# Patient Record
Sex: FEMALE | Race: BLACK OR AFRICAN AMERICAN | Employment: OTHER | URBAN - METROPOLITAN AREA
[De-identification: names, ages, dates, MRNs, and addresses within clinical notes are randomized per-mention and may not be internally consistent; named-entity substitution may affect disease eponyms.]

---

## 2018-04-17 PROBLEM — M25.562 KNEE PAIN, LEFT: Status: ACTIVE | Noted: 2018-04-17

## 2018-04-17 PROBLEM — K21.00 REFLUX ESOPHAGITIS: Status: ACTIVE | Noted: 2018-04-17

## 2018-04-17 PROBLEM — B35.3 TINEA PEDIS: Status: ACTIVE | Noted: 2018-04-17

## 2018-04-17 PROBLEM — J30.9 RHINITIS, ALLERGIC: Status: ACTIVE | Noted: 2018-04-17

## 2018-04-17 PROBLEM — R74.01 NONSPECIFIC ELEVATION OF LEVELS OF TRANSAMINASE AND LACTIC ACID DEHYDROGENASE (LDH): Status: ACTIVE | Noted: 2018-04-17

## 2018-04-17 PROBLEM — D64.9 NORMOCYTIC ANEMIA: Status: ACTIVE | Noted: 2018-04-17

## 2018-04-17 PROBLEM — M25.522 PAIN IN JOINT OF LEFT ELBOW: Status: ACTIVE | Noted: 2018-04-17

## 2018-04-17 PROBLEM — Z78.0 POSTMENOPAUSAL STATUS: Status: ACTIVE | Noted: 2018-04-17

## 2018-04-17 PROBLEM — N90.89 LESION OF VULVA: Status: ACTIVE | Noted: 2018-04-17

## 2018-04-17 PROBLEM — R42 DIZZINESS: Status: ACTIVE | Noted: 2018-04-17

## 2018-04-17 PROBLEM — F32.A DEPRESSION: Status: ACTIVE | Noted: 2018-04-17

## 2018-04-17 PROBLEM — I10 HYPERTENSION: Status: ACTIVE | Noted: 2018-04-17

## 2018-04-17 PROBLEM — N95.2 POSTMENOPAUSAL ATROPHIC VAGINITIS: Status: ACTIVE | Noted: 2018-04-17

## 2018-04-17 PROBLEM — J32.9 SINUSITIS, CHRONIC: Status: ACTIVE | Noted: 2018-04-17

## 2018-04-17 PROBLEM — M25.521 ELBOW PAIN, RIGHT: Status: ACTIVE | Noted: 2018-04-17

## 2018-04-17 PROBLEM — M17.0 OSTEOARTHRITIS OF KNEES, BILATERAL: Status: ACTIVE | Noted: 2018-04-17

## 2018-04-17 PROBLEM — H53.419 SCOTOMA: Status: ACTIVE | Noted: 2018-04-17

## 2018-04-17 PROBLEM — R10.2 PELVIC PAIN: Status: ACTIVE | Noted: 2018-04-17

## 2018-04-17 PROBLEM — R11.2 NAUSEA AND VOMITING: Status: ACTIVE | Noted: 2018-04-17

## 2018-04-17 PROBLEM — M25.561 KNEE PAIN, BILATERAL: Status: ACTIVE | Noted: 2018-04-17

## 2018-04-17 PROBLEM — Z80.0 FAMILY HISTORY OF MALIGNANT NEOPLASM OF GASTROINTESTINAL TRACT: Status: ACTIVE | Noted: 2018-04-17

## 2018-04-17 PROBLEM — M25.562 KNEE PAIN, BILATERAL: Status: ACTIVE | Noted: 2018-04-17

## 2018-04-17 PROBLEM — R20.2 PARESTHESIA OF HAND, BILATERAL: Status: ACTIVE | Noted: 2018-04-17

## 2018-04-17 PROBLEM — R31.29 OTHER MICROSCOPIC HEMATURIA: Status: ACTIVE | Noted: 2018-04-17

## 2018-04-17 PROBLEM — R07.89 CHEST PAIN, ATYPICAL: Status: ACTIVE | Noted: 2018-04-17

## 2018-04-17 PROBLEM — M25.521 PAIN IN JOINT OF RIGHT ELBOW: Status: ACTIVE | Noted: 2018-04-17

## 2018-04-17 PROBLEM — M25.50 PAIN IN JOINT INVOLVING MULTIPLE SITES: Status: ACTIVE | Noted: 2018-04-17

## 2018-04-17 PROBLEM — R19.7 DIARRHEA: Status: ACTIVE | Noted: 2018-04-17

## 2018-04-17 PROBLEM — J01.00 SINUSITIS, ACUTE MAXILLARY: Status: ACTIVE | Noted: 2018-04-17

## 2018-04-17 PROBLEM — M54.50 LUMBAR BACK PAIN: Status: ACTIVE | Noted: 2018-04-17

## 2018-04-17 PROBLEM — K29.70 GASTRITIS: Status: ACTIVE | Noted: 2018-04-17

## 2018-04-17 PROBLEM — K21.9 GERD (GASTROESOPHAGEAL REFLUX DISEASE): Status: ACTIVE | Noted: 2018-04-17

## 2018-04-17 PROBLEM — R74.02 NONSPECIFIC ELEVATION OF LEVELS OF TRANSAMINASE AND LACTIC ACID DEHYDROGENASE (LDH): Status: ACTIVE | Noted: 2018-04-17

## 2018-04-17 PROBLEM — M25.561 KNEE PAIN, RIGHT: Status: ACTIVE | Noted: 2018-04-17

## 2018-04-17 PROBLEM — D21.9 FIBROIDS: Status: ACTIVE | Noted: 2018-04-17

## 2018-04-17 PROBLEM — Z77.21 PERSONAL HISTORY OF EXPOSURE TO POTENTIALLY HAZARDOUS BODY FLUIDS: Status: ACTIVE | Noted: 2018-04-17

## 2018-06-07 PROBLEM — J01.00 SINUSITIS, ACUTE MAXILLARY: Status: RESOLVED | Noted: 2018-04-17 | Resolved: 2018-06-07

## 2018-06-07 PROBLEM — N95.0 POSTMENOPAUSAL BLEEDING: Status: ACTIVE | Noted: 2018-06-07

## 2018-06-07 PROBLEM — Z78.0 POSTMENOPAUSAL STATUS: Status: RESOLVED | Noted: 2018-04-17 | Resolved: 2018-06-07

## 2018-06-07 PROBLEM — N90.89 LESION OF VULVA: Status: RESOLVED | Noted: 2018-04-17 | Resolved: 2018-06-07

## 2018-06-07 PROBLEM — Z12.4 SCREENING FOR MALIGNANT NEOPLASM OF CERVIX: Status: ACTIVE | Noted: 2018-06-07

## 2018-06-07 PROBLEM — N36.2 URETHRAL CARUNCLE: Status: ACTIVE | Noted: 2018-06-07

## 2018-06-07 PROBLEM — J30.9 RHINITIS, ALLERGIC: Status: RESOLVED | Noted: 2018-04-17 | Resolved: 2018-06-07

## 2018-06-07 PROBLEM — Z77.21 PERSONAL HISTORY OF EXPOSURE TO POTENTIALLY HAZARDOUS BODY FLUIDS: Status: RESOLVED | Noted: 2018-04-17 | Resolved: 2018-06-07

## 2018-06-07 PROBLEM — B35.3 TINEA PEDIS: Status: RESOLVED | Noted: 2018-04-17 | Resolved: 2018-06-07

## 2018-06-07 PROBLEM — Z01.419 WOMEN'S ANNUAL ROUTINE GYNECOLOGICAL EXAMINATION: Status: ACTIVE | Noted: 2018-06-07

## 2018-07-18 PROBLEM — J30.9 ALLERGIC SINUSITIS: Status: ACTIVE | Noted: 2018-07-18

## 2019-10-18 ENCOUNTER — HOSPITAL ENCOUNTER (OUTPATIENT)
Dept: PREADMISSION TESTING | Age: 59
Discharge: HOME | End: 2019-10-18
Payer: MEDICARE

## 2019-10-18 VITALS
HEART RATE: 65 BPM | SYSTOLIC BLOOD PRESSURE: 136 MMHG | RESPIRATION RATE: 18 BRPM | BODY MASS INDEX: 22.56 KG/M2 | TEMPERATURE: 98.6 F | WEIGHT: 119.5 LBS | HEIGHT: 61 IN | DIASTOLIC BLOOD PRESSURE: 83 MMHG

## 2019-10-18 LAB
ALBUMIN SERPL-MCNC: 3.9 G/DL (ref 3.5–5)
ALBUMIN/GLOB SERPL: 1.2 {RATIO} (ref 1.1–2.2)
ALP SERPL-CCNC: 90 U/L (ref 45–117)
ALT SERPL-CCNC: 30 U/L (ref 12–78)
ANION GAP SERPL CALC-SCNC: 6 MMOL/L (ref 5–15)
AST SERPL-CCNC: 20 U/L (ref 15–37)
ATRIAL RATE: 56 BPM
BASOPHILS # BLD: 0 K/UL (ref 0–0.1)
BASOPHILS NFR BLD: 1 % (ref 0–1)
BILIRUB SERPL-MCNC: 0.4 MG/DL (ref 0.2–1)
BUN SERPL-MCNC: 15 MG/DL (ref 6–20)
BUN/CREAT SERPL: 17 (ref 12–20)
CALCIUM SERPL-MCNC: 9 MG/DL (ref 8.5–10.1)
CALCULATED P AXIS, ECG09: 80 DEGREES
CALCULATED R AXIS, ECG10: 66 DEGREES
CALCULATED T AXIS, ECG11: 74 DEGREES
CHLORIDE SERPL-SCNC: 107 MMOL/L (ref 97–108)
CO2 SERPL-SCNC: 28 MMOL/L (ref 21–32)
CREAT SERPL-MCNC: 0.88 MG/DL (ref 0.55–1.02)
DIAGNOSIS, 93000: NORMAL
DIFFERENTIAL METHOD BLD: ABNORMAL
EOSINOPHIL # BLD: 0.2 K/UL (ref 0–0.4)
EOSINOPHIL NFR BLD: 3 % (ref 0–7)
ERYTHROCYTE [DISTWIDTH] IN BLOOD BY AUTOMATED COUNT: 14.6 % (ref 11.5–14.5)
GLOBULIN SER CALC-MCNC: 3.2 G/DL (ref 2–4)
GLUCOSE SERPL-MCNC: 96 MG/DL (ref 65–100)
HCT VFR BLD AUTO: 34 % (ref 35–47)
HGB BLD-MCNC: 10.5 G/DL (ref 11.5–16)
IMM GRANULOCYTES # BLD AUTO: 0 K/UL (ref 0–0.04)
IMM GRANULOCYTES NFR BLD AUTO: 0 % (ref 0–0.5)
LYMPHOCYTES # BLD: 1.8 K/UL (ref 0.8–3.5)
LYMPHOCYTES NFR BLD: 29 % (ref 12–49)
MCH RBC QN AUTO: 27.6 PG (ref 26–34)
MCHC RBC AUTO-ENTMCNC: 30.9 G/DL (ref 30–36.5)
MCV RBC AUTO: 89.5 FL (ref 80–99)
MONOCYTES # BLD: 0.3 K/UL (ref 0–1)
MONOCYTES NFR BLD: 5 % (ref 5–13)
NEUTS SEG # BLD: 3.9 K/UL (ref 1.8–8)
NEUTS SEG NFR BLD: 62 % (ref 32–75)
NRBC # BLD: 0 K/UL (ref 0–0.01)
NRBC BLD-RTO: 0 PER 100 WBC
P-R INTERVAL, ECG05: 160 MS
PLATELET # BLD AUTO: 244 K/UL (ref 150–400)
PMV BLD AUTO: 11.3 FL (ref 8.9–12.9)
POTASSIUM SERPL-SCNC: 4.1 MMOL/L (ref 3.5–5.1)
PROT SERPL-MCNC: 7.1 G/DL (ref 6.4–8.2)
Q-T INTERVAL, ECG07: 456 MS
QRS DURATION, ECG06: 66 MS
QTC CALCULATION (BEZET), ECG08: 440 MS
RBC # BLD AUTO: 3.8 M/UL (ref 3.8–5.2)
SODIUM SERPL-SCNC: 141 MMOL/L (ref 136–145)
VENTRICULAR RATE, ECG03: 56 BPM
WBC # BLD AUTO: 6.2 K/UL (ref 3.6–11)

## 2019-10-18 PROCEDURE — 80053 COMPREHEN METABOLIC PANEL: CPT

## 2019-10-18 PROCEDURE — 85025 COMPLETE CBC W/AUTO DIFF WBC: CPT

## 2019-10-18 PROCEDURE — 36415 COLL VENOUS BLD VENIPUNCTURE: CPT

## 2019-10-18 PROCEDURE — 93005 ELECTROCARDIOGRAM TRACING: CPT

## 2019-10-18 RX ORDER — FLUTICASONE PROPIONATE 50 MCG
2 SPRAY, SUSPENSION (ML) NASAL
COMMUNITY
End: 2019-12-08 | Stop reason: SDUPTHER

## 2019-10-18 NOTE — PERIOP NOTES
Preoperative instructions reviewed with patient. Patient given  2 bottles of CHG soap. Instructions reviewed on use of CHG soap. Patient given SSI infection FAQS sheet,    Patient was given the opportunity to ask questions on the information provided.

## 2019-10-21 NOTE — PERIOP NOTES
PAT TEST RESULTS FAXED TO DR. Adeola Stewart OFFICE; CALLED OFFICE, SPOKE WITH  WHO WILL LET DR. ORANTES KNOW TO REVIEW ABNORMAL LAB RESULTS

## 2019-10-24 ENCOUNTER — ANESTHESIA EVENT (OUTPATIENT)
Dept: MEDSURG UNIT | Age: 59
End: 2019-10-24
Payer: MEDICARE

## 2019-10-25 ENCOUNTER — ANESTHESIA (OUTPATIENT)
Dept: MEDSURG UNIT | Age: 59
End: 2019-10-25
Payer: MEDICARE

## 2019-10-25 ENCOUNTER — HOSPITAL ENCOUNTER (OUTPATIENT)
Age: 59
Setting detail: OBSERVATION
Discharge: HOME | End: 2019-10-26
Attending: OTOLARYNGOLOGY | Admitting: OTOLARYNGOLOGY
Payer: MEDICARE

## 2019-10-25 DIAGNOSIS — E04.2 MULTINODULAR GOITER: Primary | ICD-10-CM

## 2019-10-25 PROBLEM — Z98.890 STATUS POST SURGERY: Status: ACTIVE | Noted: 2019-10-25

## 2019-10-25 LAB
CALCIUM SERPL-MCNC: 9.4 MG/DL (ref 8.5–10.1)
MAGNESIUM SERPL-MCNC: 2 MG/DL (ref 1.6–2.4)
PHOSPHATE SERPL-MCNC: 3.7 MG/DL (ref 2.6–4.7)

## 2019-10-25 PROCEDURE — 74011250636 HC RX REV CODE- 250/636: Performed by: NURSE ANESTHETIST, CERTIFIED REGISTERED

## 2019-10-25 PROCEDURE — 76030000003 HC AMB SURG OR TIME 1.5 TO 2: Performed by: OTOLARYNGOLOGY

## 2019-10-25 PROCEDURE — 36415 COLL VENOUS BLD VENIPUNCTURE: CPT

## 2019-10-25 PROCEDURE — 77030014008 HC SPNG HEMSTAT J&J -C: Performed by: OTOLARYNGOLOGY

## 2019-10-25 PROCEDURE — 77030040356 HC CORD BPLR FRCP COVD -A: Performed by: OTOLARYNGOLOGY

## 2019-10-25 PROCEDURE — 77030002996 HC SUT SLK J&J -A: Performed by: OTOLARYNGOLOGY

## 2019-10-25 PROCEDURE — 77030040361 HC SLV COMPR DVT MDII -B: Performed by: OTOLARYNGOLOGY

## 2019-10-25 PROCEDURE — 77030011640 HC PAD GRND REM COVD -A: Performed by: OTOLARYNGOLOGY

## 2019-10-25 PROCEDURE — 76060000063 HC AMB SURG ANES 1.5 TO 2 HR: Performed by: OTOLARYNGOLOGY

## 2019-10-25 PROCEDURE — 77030036668 HC HEMSTAT APPL W/HEMADERM KT -BARD -F: Performed by: OTOLARYNGOLOGY

## 2019-10-25 PROCEDURE — 76210000035 HC AMBSU PH I REC 1 TO 1.5 HR: Performed by: OTOLARYNGOLOGY

## 2019-10-25 PROCEDURE — 82310 ASSAY OF CALCIUM: CPT

## 2019-10-25 PROCEDURE — 74011250636 HC RX REV CODE- 250/636: Performed by: OTOLARYNGOLOGY

## 2019-10-25 PROCEDURE — 77030011267 HC ELECTRD BLD COVD -A: Performed by: OTOLARYNGOLOGY

## 2019-10-25 PROCEDURE — 84100 ASSAY OF PHOSPHORUS: CPT

## 2019-10-25 PROCEDURE — 77030018836 HC SOL IRR NACL ICUM -A: Performed by: OTOLARYNGOLOGY

## 2019-10-25 PROCEDURE — 99218 HC RM OBSERVATION: CPT

## 2019-10-25 PROCEDURE — 74011000250 HC RX REV CODE- 250: Performed by: NURSE ANESTHETIST, CERTIFIED REGISTERED

## 2019-10-25 PROCEDURE — 77030002933 HC SUT MCRYL J&J -A: Performed by: OTOLARYNGOLOGY

## 2019-10-25 PROCEDURE — 77030026438 HC STYL ET INTUB CARD -A: Performed by: ANESTHESIOLOGY

## 2019-10-25 PROCEDURE — 74011250637 HC RX REV CODE- 250/637: Performed by: OTOLARYNGOLOGY

## 2019-10-25 PROCEDURE — 77030019655 HC PRB STIM CRAN MEDT -B: Performed by: OTOLARYNGOLOGY

## 2019-10-25 PROCEDURE — 74011250636 HC RX REV CODE- 250/636: Performed by: ANESTHESIOLOGY

## 2019-10-25 PROCEDURE — 77030008698 HC TU ET REINF MEDT -D: Performed by: ANESTHESIOLOGY

## 2019-10-25 PROCEDURE — 74011250637 HC RX REV CODE- 250/637: Performed by: NURSE ANESTHETIST, CERTIFIED REGISTERED

## 2019-10-25 PROCEDURE — 74011000250 HC RX REV CODE- 250: Performed by: OTOLARYNGOLOGY

## 2019-10-25 PROCEDURE — 77030021052 HC RNG RETRCTR STAY COOP -A: Performed by: OTOLARYNGOLOGY

## 2019-10-25 PROCEDURE — 77030031753 HC SHR ENDO COAG HARM J&J -E: Performed by: OTOLARYNGOLOGY

## 2019-10-25 PROCEDURE — 77030040922 HC BLNKT HYPOTHRM STRY -A

## 2019-10-25 PROCEDURE — 83735 ASSAY OF MAGNESIUM: CPT

## 2019-10-25 PROCEDURE — 77030031139 HC SUT VCRL2 J&J -A: Performed by: OTOLARYNGOLOGY

## 2019-10-25 PROCEDURE — 88307 TISSUE EXAM BY PATHOLOGIST: CPT

## 2019-10-25 PROCEDURE — 77030008698 HC TU ET REINF MEDT -D: Performed by: OTOLARYNGOLOGY

## 2019-10-25 RX ORDER — ONDANSETRON 2 MG/ML
INJECTION INTRAMUSCULAR; INTRAVENOUS AS NEEDED
Status: DISCONTINUED | OUTPATIENT
Start: 2019-10-25 | End: 2019-10-25 | Stop reason: HOSPADM

## 2019-10-25 RX ORDER — CITALOPRAM 20 MG/1
20 TABLET, FILM COATED ORAL DAILY
Status: DISCONTINUED | OUTPATIENT
Start: 2019-10-26 | End: 2019-10-26 | Stop reason: HOSPADM

## 2019-10-25 RX ORDER — MIDAZOLAM HYDROCHLORIDE 1 MG/ML
1 INJECTION, SOLUTION INTRAMUSCULAR; INTRAVENOUS AS NEEDED
Status: DISCONTINUED | OUTPATIENT
Start: 2019-10-25 | End: 2019-10-25 | Stop reason: HOSPADM

## 2019-10-25 RX ORDER — ROCURONIUM BROMIDE 10 MG/ML
INJECTION, SOLUTION INTRAVENOUS AS NEEDED
Status: DISCONTINUED | OUTPATIENT
Start: 2019-10-25 | End: 2019-10-25 | Stop reason: HOSPADM

## 2019-10-25 RX ORDER — DEXMEDETOMIDINE HYDROCHLORIDE 100 UG/ML
INJECTION, SOLUTION INTRAVENOUS AS NEEDED
Status: DISCONTINUED | OUTPATIENT
Start: 2019-10-25 | End: 2019-10-25 | Stop reason: HOSPADM

## 2019-10-25 RX ORDER — LIDOCAINE HYDROCHLORIDE 20 MG/ML
INJECTION, SOLUTION EPIDURAL; INFILTRATION; INTRACAUDAL; PERINEURAL AS NEEDED
Status: DISCONTINUED | OUTPATIENT
Start: 2019-10-25 | End: 2019-10-25 | Stop reason: HOSPADM

## 2019-10-25 RX ORDER — SUCCINYLCHOLINE CHLORIDE 20 MG/ML
INJECTION INTRAMUSCULAR; INTRAVENOUS AS NEEDED
Status: DISCONTINUED | OUTPATIENT
Start: 2019-10-25 | End: 2019-10-25 | Stop reason: HOSPADM

## 2019-10-25 RX ORDER — HYDRALAZINE HYDROCHLORIDE 20 MG/ML
INJECTION INTRAMUSCULAR; INTRAVENOUS
Status: DISPENSED
Start: 2019-10-25 | End: 2019-10-25

## 2019-10-25 RX ORDER — DEXAMETHASONE SODIUM PHOSPHATE 4 MG/ML
INJECTION, SOLUTION INTRA-ARTICULAR; INTRALESIONAL; INTRAMUSCULAR; INTRAVENOUS; SOFT TISSUE AS NEEDED
Status: DISCONTINUED | OUTPATIENT
Start: 2019-10-25 | End: 2019-10-25 | Stop reason: HOSPADM

## 2019-10-25 RX ORDER — SODIUM CHLORIDE 0.9 % (FLUSH) 0.9 %
5-40 SYRINGE (ML) INJECTION EVERY 8 HOURS
Status: DISCONTINUED | OUTPATIENT
Start: 2019-10-25 | End: 2019-10-25 | Stop reason: HOSPADM

## 2019-10-25 RX ORDER — HYDROMORPHONE HYDROCHLORIDE 1 MG/ML
0.2 INJECTION, SOLUTION INTRAMUSCULAR; INTRAVENOUS; SUBCUTANEOUS
Status: DISCONTINUED | OUTPATIENT
Start: 2019-10-25 | End: 2019-10-25 | Stop reason: HOSPADM

## 2019-10-25 RX ORDER — PROPOFOL 10 MG/ML
INJECTION, EMULSION INTRAVENOUS AS NEEDED
Status: DISCONTINUED | OUTPATIENT
Start: 2019-10-25 | End: 2019-10-25 | Stop reason: HOSPADM

## 2019-10-25 RX ORDER — OXYCODONE AND ACETAMINOPHEN 5; 325 MG/1; MG/1
1 TABLET ORAL
Status: DISCONTINUED | OUTPATIENT
Start: 2019-10-25 | End: 2019-10-26 | Stop reason: HOSPADM

## 2019-10-25 RX ORDER — SCOLOPAMINE TRANSDERMAL SYSTEM 1 MG/1
PATCH, EXTENDED RELEASE TRANSDERMAL AS NEEDED
Status: DISCONTINUED | OUTPATIENT
Start: 2019-10-25 | End: 2019-10-25 | Stop reason: HOSPADM

## 2019-10-25 RX ORDER — SODIUM CHLORIDE, SODIUM LACTATE, POTASSIUM CHLORIDE, CALCIUM CHLORIDE 600; 310; 30; 20 MG/100ML; MG/100ML; MG/100ML; MG/100ML
100 INJECTION, SOLUTION INTRAVENOUS CONTINUOUS
Status: DISCONTINUED | OUTPATIENT
Start: 2019-10-25 | End: 2019-10-25 | Stop reason: HOSPADM

## 2019-10-25 RX ORDER — SODIUM CHLORIDE 0.9 % (FLUSH) 0.9 %
5-40 SYRINGE (ML) INJECTION EVERY 8 HOURS
Status: DISCONTINUED | OUTPATIENT
Start: 2019-10-25 | End: 2019-10-26 | Stop reason: HOSPADM

## 2019-10-25 RX ORDER — ACETAMINOPHEN 325 MG/1
650 TABLET ORAL
Status: DISCONTINUED | OUTPATIENT
Start: 2019-10-25 | End: 2019-10-26 | Stop reason: HOSPADM

## 2019-10-25 RX ORDER — DIPHENHYDRAMINE HYDROCHLORIDE 50 MG/ML
12.5 INJECTION, SOLUTION INTRAMUSCULAR; INTRAVENOUS AS NEEDED
Status: DISCONTINUED | OUTPATIENT
Start: 2019-10-25 | End: 2019-10-25 | Stop reason: HOSPADM

## 2019-10-25 RX ORDER — FENTANYL CITRATE 50 UG/ML
INJECTION, SOLUTION INTRAMUSCULAR; INTRAVENOUS AS NEEDED
Status: DISCONTINUED | OUTPATIENT
Start: 2019-10-25 | End: 2019-10-25 | Stop reason: HOSPADM

## 2019-10-25 RX ORDER — HYDRALAZINE HYDROCHLORIDE 20 MG/ML
10 INJECTION INTRAMUSCULAR; INTRAVENOUS ONCE
Status: COMPLETED | OUTPATIENT
Start: 2019-10-25 | End: 2019-10-25

## 2019-10-25 RX ORDER — ONDANSETRON 2 MG/ML
4 INJECTION INTRAMUSCULAR; INTRAVENOUS
Status: DISCONTINUED | OUTPATIENT
Start: 2019-10-25 | End: 2019-10-26 | Stop reason: HOSPADM

## 2019-10-25 RX ORDER — FENTANYL CITRATE 50 UG/ML
50 INJECTION, SOLUTION INTRAMUSCULAR; INTRAVENOUS AS NEEDED
Status: DISCONTINUED | OUTPATIENT
Start: 2019-10-25 | End: 2019-10-25 | Stop reason: HOSPADM

## 2019-10-25 RX ORDER — FERROUS SULFATE, DRIED 160(50) MG
2 TABLET, EXTENDED RELEASE ORAL EVERY 6 HOURS
Status: DISCONTINUED | OUTPATIENT
Start: 2019-10-25 | End: 2019-10-26 | Stop reason: HOSPADM

## 2019-10-25 RX ORDER — SODIUM CHLORIDE, SODIUM LACTATE, POTASSIUM CHLORIDE, CALCIUM CHLORIDE 600; 310; 30; 20 MG/100ML; MG/100ML; MG/100ML; MG/100ML
INJECTION, SOLUTION INTRAVENOUS
Status: DISCONTINUED | OUTPATIENT
Start: 2019-10-25 | End: 2019-10-25 | Stop reason: HOSPADM

## 2019-10-25 RX ORDER — FENTANYL CITRATE 50 UG/ML
25 INJECTION, SOLUTION INTRAMUSCULAR; INTRAVENOUS
Status: DISCONTINUED | OUTPATIENT
Start: 2019-10-25 | End: 2019-10-25 | Stop reason: HOSPADM

## 2019-10-25 RX ORDER — LIDOCAINE HYDROCHLORIDE AND EPINEPHRINE 10; 10 MG/ML; UG/ML
INJECTION, SOLUTION INFILTRATION; PERINEURAL AS NEEDED
Status: DISCONTINUED | OUTPATIENT
Start: 2019-10-25 | End: 2019-10-25 | Stop reason: HOSPADM

## 2019-10-25 RX ORDER — CEFAZOLIN SODIUM/WATER 2 G/20 ML
2 SYRINGE (ML) INTRAVENOUS ONCE
Status: COMPLETED | OUTPATIENT
Start: 2019-10-25 | End: 2019-10-25

## 2019-10-25 RX ORDER — GUAIFENESIN 600 MG/1
600 TABLET, EXTENDED RELEASE ORAL
Status: DISCONTINUED | OUTPATIENT
Start: 2019-10-25 | End: 2019-10-26 | Stop reason: HOSPADM

## 2019-10-25 RX ORDER — MIDAZOLAM HYDROCHLORIDE 1 MG/ML
0.5 INJECTION, SOLUTION INTRAMUSCULAR; INTRAVENOUS
Status: DISCONTINUED | OUTPATIENT
Start: 2019-10-25 | End: 2019-10-25 | Stop reason: HOSPADM

## 2019-10-25 RX ORDER — DEXTROSE, SODIUM CHLORIDE, AND POTASSIUM CHLORIDE 5; .45; .15 G/100ML; G/100ML; G/100ML
25 INJECTION INTRAVENOUS CONTINUOUS
Status: DISCONTINUED | OUTPATIENT
Start: 2019-10-25 | End: 2019-10-26 | Stop reason: HOSPADM

## 2019-10-25 RX ORDER — ROPIVACAINE HYDROCHLORIDE 5 MG/ML
150 INJECTION, SOLUTION EPIDURAL; INFILTRATION; PERINEURAL AS NEEDED
Status: DISCONTINUED | OUTPATIENT
Start: 2019-10-25 | End: 2019-10-25 | Stop reason: HOSPADM

## 2019-10-25 RX ORDER — LIDOCAINE HYDROCHLORIDE 10 MG/ML
0.1 INJECTION, SOLUTION EPIDURAL; INFILTRATION; INTRACAUDAL; PERINEURAL AS NEEDED
Status: DISCONTINUED | OUTPATIENT
Start: 2019-10-25 | End: 2019-10-25 | Stop reason: HOSPADM

## 2019-10-25 RX ORDER — MIDAZOLAM HYDROCHLORIDE 1 MG/ML
INJECTION, SOLUTION INTRAMUSCULAR; INTRAVENOUS AS NEEDED
Status: DISCONTINUED | OUTPATIENT
Start: 2019-10-25 | End: 2019-10-25 | Stop reason: HOSPADM

## 2019-10-25 RX ORDER — SODIUM CHLORIDE 0.9 % (FLUSH) 0.9 %
5-40 SYRINGE (ML) INJECTION AS NEEDED
Status: DISCONTINUED | OUTPATIENT
Start: 2019-10-25 | End: 2019-10-26 | Stop reason: HOSPADM

## 2019-10-25 RX ORDER — SODIUM CHLORIDE 0.9 % (FLUSH) 0.9 %
5-40 SYRINGE (ML) INJECTION AS NEEDED
Status: DISCONTINUED | OUTPATIENT
Start: 2019-10-25 | End: 2019-10-25 | Stop reason: HOSPADM

## 2019-10-25 RX ORDER — LORAZEPAM 0.5 MG/1
0.5 TABLET ORAL
Status: DISCONTINUED | OUTPATIENT
Start: 2019-10-25 | End: 2019-10-26 | Stop reason: HOSPADM

## 2019-10-25 RX ORDER — LEVOTHYROXINE SODIUM 112 UG/1
112 TABLET ORAL DAILY
Status: DISCONTINUED | OUTPATIENT
Start: 2019-10-25 | End: 2019-10-26 | Stop reason: HOSPADM

## 2019-10-25 RX ORDER — OXYCODONE AND ACETAMINOPHEN 10; 325 MG/1; MG/1
1 TABLET ORAL
Status: DISCONTINUED | OUTPATIENT
Start: 2019-10-25 | End: 2019-10-26 | Stop reason: HOSPADM

## 2019-10-25 RX ADMIN — MIDAZOLAM 2 MG: 1 INJECTION INTRAMUSCULAR; INTRAVENOUS at 07:41

## 2019-10-25 RX ADMIN — FENTANYL CITRATE 25 MCG: 50 INJECTION, SOLUTION INTRAMUSCULAR; INTRAVENOUS at 09:50

## 2019-10-25 RX ADMIN — CALCIUM CARBONATE-VITAMIN D TAB 500 MG-200 UNIT 2 TABLET: 500-200 TAB at 11:50

## 2019-10-25 RX ADMIN — DEXAMETHASONE SODIUM PHOSPHATE 8 MG: 4 INJECTION, SOLUTION INTRAMUSCULAR; INTRAVENOUS at 07:59

## 2019-10-25 RX ADMIN — PROPOFOL 40 MG: 10 INJECTION, EMULSION INTRAVENOUS at 08:15

## 2019-10-25 RX ADMIN — ROCURONIUM BROMIDE 5 MG: 10 SOLUTION INTRAVENOUS at 07:44

## 2019-10-25 RX ADMIN — DEXMEDETOMIDINE HYDROCHLORIDE 2 MCG: 100 INJECTION, SOLUTION, CONCENTRATE INTRAVENOUS at 08:10

## 2019-10-25 RX ADMIN — DEXMEDETOMIDINE HYDROCHLORIDE 4 MCG: 100 INJECTION, SOLUTION, CONCENTRATE INTRAVENOUS at 08:17

## 2019-10-25 RX ADMIN — ONDANSETRON HYDROCHLORIDE 4 MG: 2 INJECTION, SOLUTION INTRAVENOUS at 09:05

## 2019-10-25 RX ADMIN — ACETAMINOPHEN 650 MG: 325 TABLET, FILM COATED ORAL at 20:50

## 2019-10-25 RX ADMIN — PROPOFOL 40 MG: 10 INJECTION, EMULSION INTRAVENOUS at 08:17

## 2019-10-25 RX ADMIN — DEXMEDETOMIDINE HYDROCHLORIDE 4 MCG: 100 INJECTION, SOLUTION, CONCENTRATE INTRAVENOUS at 07:59

## 2019-10-25 RX ADMIN — SUCCINYLCHOLINE CHLORIDE 70 MG: 20 INJECTION, SOLUTION INTRAMUSCULAR; INTRAVENOUS at 07:44

## 2019-10-25 RX ADMIN — FENTANYL CITRATE 50 MCG: 50 INJECTION, SOLUTION INTRAMUSCULAR; INTRAVENOUS at 07:44

## 2019-10-25 RX ADMIN — DEXMEDETOMIDINE HYDROCHLORIDE 4 MCG: 100 INJECTION, SOLUTION, CONCENTRATE INTRAVENOUS at 07:53

## 2019-10-25 RX ADMIN — FENTANYL CITRATE 25 MCG: 50 INJECTION, SOLUTION INTRAMUSCULAR; INTRAVENOUS at 08:16

## 2019-10-25 RX ADMIN — DEXMEDETOMIDINE HYDROCHLORIDE 2 MCG: 100 INJECTION, SOLUTION, CONCENTRATE INTRAVENOUS at 08:12

## 2019-10-25 RX ADMIN — GUAIFENESIN 600 MG: 600 TABLET, EXTENDED RELEASE ORAL at 18:38

## 2019-10-25 RX ADMIN — DEXMEDETOMIDINE HYDROCHLORIDE 4 MCG: 100 INJECTION, SOLUTION, CONCENTRATE INTRAVENOUS at 08:15

## 2019-10-25 RX ADMIN — LIDOCAINE HYDROCHLORIDE 100 MG: 20 INJECTION, SOLUTION EPIDURAL; INFILTRATION; INTRACAUDAL; PERINEURAL at 07:44

## 2019-10-25 RX ADMIN — FENTANYL CITRATE 25 MCG: 50 INJECTION, SOLUTION INTRAMUSCULAR; INTRAVENOUS at 08:11

## 2019-10-25 RX ADMIN — SODIUM CHLORIDE, SODIUM LACTATE, POTASSIUM CHLORIDE, AND CALCIUM CHLORIDE 100 ML/HR: 600; 310; 30; 20 INJECTION, SOLUTION INTRAVENOUS at 06:49

## 2019-10-25 RX ADMIN — PROPOFOL 120 MG: 10 INJECTION, EMULSION INTRAVENOUS at 07:44

## 2019-10-25 RX ADMIN — FENTANYL CITRATE 25 MCG: 50 INJECTION, SOLUTION INTRAMUSCULAR; INTRAVENOUS at 09:45

## 2019-10-25 RX ADMIN — DEXTROSE MONOHYDRATE, SODIUM CHLORIDE, AND POTASSIUM CHLORIDE 25 ML/HR: 50; 4.5; 1.49 INJECTION, SOLUTION INTRAVENOUS at 11:00

## 2019-10-25 RX ADMIN — SCOPALAMINE 1 PATCH: 1 PATCH, EXTENDED RELEASE TRANSDERMAL at 07:40

## 2019-10-25 RX ADMIN — SODIUM CHLORIDE, POTASSIUM CHLORIDE, SODIUM LACTATE AND CALCIUM CHLORIDE: 600; 310; 30; 20 INJECTION, SOLUTION INTRAVENOUS at 07:41

## 2019-10-25 RX ADMIN — FENTANYL CITRATE 25 MCG: 50 INJECTION, SOLUTION INTRAMUSCULAR; INTRAVENOUS at 09:40

## 2019-10-25 RX ADMIN — CALCIUM CARBONATE-VITAMIN D TAB 500 MG-200 UNIT 2 TABLET: 500-200 TAB at 18:38

## 2019-10-25 RX ADMIN — LEVOTHYROXINE SODIUM 112 MCG: 112 TABLET ORAL at 11:50

## 2019-10-25 RX ADMIN — ACETAMINOPHEN 650 MG: 325 TABLET, FILM COATED ORAL at 11:50

## 2019-10-25 RX ADMIN — Medication 2 G: at 07:58

## 2019-10-25 RX ADMIN — HYDRALAZINE HYDROCHLORIDE 10 MG: 20 INJECTION INTRAMUSCULAR; INTRAVENOUS at 11:27

## 2019-10-25 NOTE — ANESTHESIA PREPROCEDURE EVALUATION
Relevant Problems   No relevant active problems       Anesthetic History   No history of anesthetic complications  PONV          Review of Systems / Medical History  Patient summary reviewed, nursing notes reviewed and pertinent labs reviewed    Pulmonary  Within defined limits                 Neuro/Psych   Within defined limits      Psychiatric history     Cardiovascular  Within defined limits  Hypertension                   GI/Hepatic/Renal  Within defined limits   GERD           Endo/Other  Within defined limits    Hypothyroidism  Arthritis     Other Findings              Physical Exam    Airway  Mallampati: II  TM Distance: > 6 cm  Neck ROM: normal range of motion   Mouth opening: Normal     Cardiovascular  Regular rate and rhythm,  S1 and S2 normal,  no murmur, click, rub, or gallop             Dental  No notable dental hx       Pulmonary  Breath sounds clear to auscultation               Abdominal  GI exam deferred       Other Findings            Anesthetic Plan    ASA: 2  Anesthesia type: general          Induction: Intravenous  Anesthetic plan and risks discussed with: Patient

## 2019-10-25 NOTE — PERIOP NOTES
TRANSFER - OUT REPORT:    Verbal report given to CARLOS MANUEL Vieira (name) on Otto Manuel being transferred to [address] [place] [address] room 321 (unit) for routine post - op       Report consisted of patient's Situation, Background, Assessment and   Recommendations(SBAR). Time Pre op antibiotic given: 2 g IV Ancef at 7:58 AM  Anesthesia Stop time: 9:23 AM  Raphael Present on Transfer to [facility] Will [place] for Raphael on Chart:NO  Discharge Prescriptions with Chart:NO  Tylenol given: NO  Exparel given: NO    Information from the following report(s) SBAR, Kardex, OR Summary, Procedure Summary, Intake/Output, MAR, Recent Results, Procedure Verification and Quality Measures was reviewed with the receiving nurse. Opportunity for questions and clarification was provided. Is the patient on 02? NO     Is the patient on a monitor? NO    Is the nurse transporting with the patient? YES    Bedside handoff to be conducted for further clarification.

## 2019-10-25 NOTE — PROGRESS NOTES
TRANSFER - IN REPORT:    Verbal report received from [facility]) on Mayda Manuel  being received from [facility](unit) for routine progression of care      Report consisted of patients Situation, Background, Assessment and   Recommendations(SBAR). Information from the following report(s) SBAR, Kardex, OR Summary, Procedure Summary, Intake/Output and MAR was reviewed with the receiving nurse. Opportunity for questions and clarification was provided. Assessment completed upon patients arrival to unit and care assumed.  No complaints of pain or nausea    1110- Patient had to go to the bathroom, when she got up she felt dizzy and unstable, sat patient down in chair, was momentarily un responsive, vitals were taken all stable except /98    1115- MD paged    1120- MD gave orders for 10mg of Hydralazine IV

## 2019-10-25 NOTE — BRIEF OP NOTE
BRIEF OPERATIVE NOTE    Date of Procedure: 25 October 2019    Pre-operative Diagnosis: 79-year-old euthyroid female with symptomatic multi-nodular goiter; US shows 2 right thyroid and 1 left thyroid nodule that are TI-RADS 4 and one isthmus nodule that is TI-RADS 3. Post-operative Diagnosis: 79-year-old euthyroid female with symptomatic multi-nodular goiter; US shows 2 right thyroid and 1 left thyroid nodule that are TI-RADS 4 and one isthmus nodule that is TI-RADS 3. Procedure(s):   Neck exploration   Total Thyroidectomy  Laryngeal nerve monitoring  Surgeon(s) and Role:   * Cinthya Oakley MD - Co-surgeon  * Hemant Lee MD, CONOR FACS - Co-surgeon   Anesthesia: General endotracheal anesthesia (GETA)  Urine output: Not documented  Estimated Blood Loss: 2ml   IVF: 600 ml crystalloid             Drains: None  Patient in room at 0743 hours  Antibiotic prophylaxis: ANCEF 2 g at 0759 hours  Preliminary time out at 0755 hours  Beta blocker not indicated prior to operation  Prayer at 0807 hours  Time out for surgery at 0807 hours    (During the time out for surgery the correct patient, operative site and procedure were confirmed, along with having the necessary equipment on hand to perform the operation safely)  Start of surgery at 0808 hours   End of surgery at 0854 hours   VTE prophylaxis with bilateral lower extremity compression devices   Pressure points padded   Sponge, sharp and instrument count: Correct    History:   79-year-old euthyroid female with symptomatic multi-nodular goiter  Symptoms include sore throat, odynophagia and swollen glands as well as neck stiffness    No history of head or neck radiation. No personal or family history of thyroid cancer. No personal history of hyper- or hypo-thyroidism    07/2019    THYROID ULTRASOUND:     CLINICAL HISTORY:  Enlarged thyroid. Sonographic evaluation of the thyroid. FINDINGS:  The right lobe of the thyroid measures 2.8 x 3.9 x 4.8 cm.   The left lobe measures 4.6 x 1.5 x 1.8 cm. The isthmus is 3.9 mm in thickness. Within the right lobe there are two nodules; one in the upper pole measures 3.2 x 1.5 x 3.0 cm in size. The nodule is wider than it is tall. It is isoechoic and does contain internal vascularity with no definite calcifications. The second nodule in the right lobe is in the lower pole measuring 3.0 x 2.4 x 2.5 cm. This nodule is also wider than it is tall, is well circumscribed, isoechoic and does contain some small echogenic foci possibly representing small calcifications. Internal vascularity is seen. Within the left lobe of the thyroid is a solitary nodule measuring 2.0 x 1.2 x 1.6 cm in the mid pole. The nodule is hypoechoic and does contain some cystic areas within. The nodule is also wider than it is tall. It is well circumscribed and contains internal vascularity with no definite calcifications. In the isthmus is a small slightly echogenic nodule measuring 8 x 8 x 9 mm in size. The nodule is wider than it is tall and contains a hypoechoic well circumscribed rim with no calcifications. Internal vascularity is seen. IMPRESSION:  1. THE TWO NODULES IN THE RIGHT LOBE OF THE THYROID ARE TI-RADS 4.  GIVEN THEIR SIZE, FINE NEEDLE ASPIRATION BIOPSY IS RECOMMENDED. 2. THE NODULE IN THE LEFT LOBE OF THE THYROID IS ALSO TI-RADS 4. FINE NEEDLE ASPIRATION BIOPSY IS RECOMMENDED. THE NODULE IN THE ISTHMUS IS TI-RADS 3. FOLLOW-UP IS RECOMMENDED. Procedure (s) performed:   Neck exploration   Total Thyroidectomy  Laryngeal nerve monitoring  Specimens:    Total Thyroidectomy - single short stitch right superior thyroid pole; double stitch anterior isthmus  Findings:    Multinodular goiter   Encapsulated right superior thyroid nodule 3.0 x 1.5 cm   Encapsulated 3.0 x 2.5 cm right lower thyroid nodule   Encapsulated 2.0 x 1.5 cm left thyroid nodule   Encapsulated sub-centimeter isthmus nodule   The right thyroid lobe measures 5.0 x 4.0 cm.  The left thyroid lobe measures 4.5 x 2.0 cm.  The thyroid isthmus measures 3 mm in thickness.  No palpable central or lateral neck adenopathy.  Two normal appearing parathyroid glands (right superior and right inferior) identified and preserved on a vascular pedicle.  Two normal appearing parathyroid glands (left superior and left inferior) identified and preserved on a vascular pedicle.  Both recurrent laryngeal nerves were identified and carefully preserved throughout the entire course of the operation    Both recurrent laryngeal nerves were confirmed to be structurally and functionally intact - audible signal attained with nerve stimulator / NIM system when applied to both the right and left recurrent laryngeal nerve   Excellent hemostasis confirmed at end of operation    Surgical Staff:  Circ-1: Sly Murillo RN  Scrub Tech-1: Rafael Solano RN-1: Tee Ha RN  Surg Asst-1: Joan CARTER  Event Time In Time Out   Incision Start 7484    Incision Close 0857        Specimens:   ID Type Source Tests Collected by Time Destination   1 : total thyroidectomy Fresh Thyroid  Nieves Greer MD 10/25/2019 7407 Pathology      Operative Procedure: The patient was escorted to the operating room   Upon arrival to the operative room, the patient, procedure, and operative site were confirmed via a pre-operative time-out. During the time out for surgery the correct patient, operative site and procedure were confirmed, along with having the necessary equipment on hand to perform the operation safely. All in attendance were in agreement. The patient was placed in the supine position and general anesthesia induced without incident. General endotracheal anesthesia was induced with endotracheal placement of NIM tube in order to facilitate laryngeal neuro-monitoring. The eyes were taped shut.    Prophylactic antibiotic (ANCEF 2 g) was administered prior to the procedure. Beta blockade was not indicated prior to operation   With the patient in the supine position the arms were tucked, a pillow was placed behind the knees and the heels padded; the neck was slightly extended, and table elevated to 30 degrees. A roll was placed behind the scapulae to create mild degree of neck flexion  The occiput was placed on a cushion. Subcutaneous NIM monitoring system leads were placed. Pressure ulcer prophylaxis was in effect with pressure point padding. VTE prophylaxis was also in effect with lower extremity mechanical compression devices   Sterile anterior neck/sternum prep (Chlorhexidine - alcohol) and drape   We prayed for our patient and we repeated the TIME OUT prior to commencing the operation to confirm the    correct patient,    correct operative site,    correct operative procedure and    necessary equipment on hand to conduct the operation safely. Local anesthesia (50:50 mix of 1% LIDO w/ EPI - 6 ml) infiltrated subcutaneously at site of proposed anterior cervical skin incision   Low anterior, transverse cervical incision was made, 5 cm in length, two finger breadths superior to the sternoclavicular joints, and parallel to the normal skin lines of the neck. The skin, subcutaneous tissue and platysma muscle were divided   Sub-platysmal flaps were created by dissecting in the avascular plane superiorly to the thyroid cartilage and inferiorly to the suprasternal notch. Strap musculature (sternohyoid muscle and sternothyroid muscle) was  in the avascular midline (by incising the cervical linea alba), the incision extending from the suprasternal notch to the thyroid cartilage. The anterior jugular veins were ligated and divided. The anterior jugular veins empty into the jugular arch low in the neck anterior to the strap musculature. The jugular arch was ligated and divided in the space of Burns.   A plane of dissection was developed between the posterior surface of the strap musculature (sternothyroid muscle) and the anterior surface of the thyroid gland. The right lateral thyroid recess was developed using gentle blunt dissection. The right thyroid lobe was gently lifted anteriorly. The right middle thyroid vein was isolated (no non-recurrent laryngeal nerve was observed). The right middle thyroid vein was controlled, ligated and divided with Harmonic scalpel on the right thyroid capsule. We then focused our attention on the right superior thyroid pole  The space was developed between the right cricothyroid muscle and medial aspect of right superior thyroid pole. Recognizing that variations exist between the course of the superior laryngeal nerves and the first branch of the external carotid artery (the superior thyroid artery), we took great care to dissect the superior thyroid artery and its branches prior to ligation. We were careful to keep the external branch of the superior laryngeal nerve (EBSLN, which provides motor fibers to the cricothyroid muscle and the inferior pharyngeal constrictor muscle) out of harms way. The right thyroid lobe was retracted in a caudal and lateral direction. The right superior pole vessels (right superior thyroid artery and right superior thyroid vein) were skeletonized and meticulously controlled individually (with Harmonic scalpel division) on the thyroid capsule in order to avoid injury to the right superior laryngeal nerve's external branch. The right recurrent laryngeal nerve, which originates from the vagus nerve, loops around the right subclavian artery and ascends in the neck between the trachea and esophagus. The right recurrent laryngeal nerve was palpable and visually identified in the right trachea-esophageal (T-E) groove in proximity to the right inferior thyroid artery and right inferior thyroid vein.   The right recurrent laryngeal nerves terminal branches provide motor innervation to all intrinsic muscles of the larynx except the cricothyroid muscle - innervated by EBSLN. The branches of the right inferior thyroid artery and branches of the right inferior thyroid vein were carefully dissected while maintaining the encountered right recurrent laryngeal nerve in view and out of harms way. The right inferior thyroid artery sends branches to both the superior and inferior parathyroid glands. The superior parathyroid gland (usually encountered at the Ligament of Berry lateral to the recurrent nerve) is more posterior in position relative to the more anteriorly situated inferior parathyroid gland (usually encountered anterior to the recurrent nerve, inferior to where the nerve crosses the inferior thyroid artery). The right inferior thyroid artery branches were controlled on the thyroid capsule after its identified parathyroid branches were given off. Branches of the right inferior thyroid vein were controlled on the thyroid capsule with Harmonic scalpel in order to avoid injury to the right inferior parathyroid gland that was identified anterior and medial to the right recurrent laryngeal nerve. Both the inferior parathyroid gland and right recurrent laryngeal nerve were meticulously preserved intact and maintained out of harms way  The right inferior parathyroid gland was normal in size and gross appearance, and well perfused with its vasculature carefully preserved intact. Diligent search for the right superior parathyroid gland superior to the middle thyroid vein and posterior and lateral to the right recurrent laryngeal nerve identified a normal appearing, well perfused right superior parathyroid gland, which was also carefully maintained out of harms way. The right superior parathyroid gland was carefully preserved on its vascular pedicle.    The right tubercle of Zukerkandl of the thyroid was identified and gently dissected free of both identified branches of the right recurrent laryngeal nerve. We recognize that this particular area where the right recurrent laryngeal nerve most closely approximates the thyroid gland and the terminal ascending branches of the inferior thyroid artery is where the recurrent nerve is at greatest risk of injury. The terminal branches of the right recurrent nerve may be non-recurrent (rarely), or may be anterior, posterior or in between the branches of the ascending right inferior thyroid artery. The Ligament of Jacquiline Bullion was identified at the posterolateral portion of the thyroid lobe just caudal to the cricoid cartilage, and it was gently dissected in order to identify its relationship to the terminal aspects of the right recurrent laryngeal nerve, which was found to be passing posterior to the Ligament of Mayes to gain entry into the larynx. The terminal branches of the right inferior thyroid artery were meticulously controlled with bi-polar cautery taking care to spare the adjacent right recurrent laryngeal nerve. Both terminal right recurrent laryngeal nerve branches were identified and meticulously preserved, as they were seen passing under the inferior constrictor muscle, and through the cricothyroid membrane near the right cricothyroid joint, just superior to the cricoid cartilage, to enter the posterior and medial aspect of the larynx. The right thyroid lobe was further mobilized with care to the left. The attachments of the posterior right thyroid lobe and isthmus to the pre-tracheal fascia were divided with precision taking great care to avoid injury to the terminal branches of the right recurrent laryngeal nerve, which takes a more oblique course when compared to the more cephalo-caudad orientation of the recurrent laryngeal nerve on the left side of the neck, which also courses within the TE groove.    The structural and functional integrity of the right recurrent laryngeal nerve was confirmed with the nerve stimulator (Oppa system), as a prominent, audible signal was attained with stimulation of the right recurrent nerve in its proximal, mid and distal cervical portions. Excellent hemostasis was confirmed along with normal superior, and inferior viable appearing/well perfused right parathyroid glands. Attention was then directed to the left neck. The same surgical principles and maneuvers were applied to the contralateral neck to complete the total thyroidectomy, briefly:  The left lateral thyroid recess was developed posterior to the left sternothyroid muscle utilizing gentle blunt dissection. The left middle thyroid was identified, dissected, ligated and divided with the Harmonic scalpel. The space between the left cricothyroid muscle and medial aspect of the left superior thyroid pole was developed using gentle dissection. The left superior thyroid pole vessels (left superior thyroid artery and left superior thyroid vein) were meticulously controlled on the thyroid capsule with Harmonic scalpel ligation and division, taking great care to avoid injury to the left superior laryngeal nerve's external branch. The left thyroid lobe was gently mobilized anteromedially to the right. The left T-E groove was gently dissected. The left recurrent laryngeal nerve was encountered in proximity to the left inferior thyroid artery. The branches of the left inferior thyroid artery and left inferior thyroid vein were carefully controlled on the thyroid capsule in order to avoid injury and maintain blood supply to the identified, normal appearing left superior and inferior parathyroid glands. The well perfused, normal appearing left superior parathyroid gland was situated cephalad of the left middle thyroid vein, and posterior and lateral to the encountered left recurrent laryngeal nerve.    Dissection anterior and medial to the left recurrent laryngeal nerve and near the left inferior thyroid pole identified a well perfused, normal appearing left inferior parathyroid gland. Both parathyroid glands appeared viable/well perfused, and were carefully preserved on their respective vascular pedicles, intact throughout the entire course of our dissection. The superior and medial aspect of the left thyroid lobe was dissected free of both identified branches of the left recurrent laryngeal nerve. Both branches of the left recurrent laryngeal nerve were seen terminating near the left cricothyroid joint, and both recurrent laryngeal nerve branches were carefully preserved. The Tubercle of Zukerkandl of the left thyroid lobe was identified and gently dissected free of both identified branches of the left recurrent laryngeal nerve. The attachments of the posterior left thyroid lobe to the trachea were divided taking care not injure the clearly visualized terminal branches of the left recurrent laryngeal nerve. The total thyroidectomy specimen was delivered, oriented for pathological processing (single stitch right upper thyroid pole, double stitch anterior isthmus) and submitted for permanent pathology analysis. The structural and functional integrity of the left recurrent laryngeal nerve, and the right recurrent laryngeal nerve was confirmed with the nerve stimulator ([employer] System), as a prominent audible signal was attained when both branches of the left and right recurrent laryngeal nerves were stimulated. A diligent search of the central neck and both lateral areas of the neck was unrevealing; specifically, there was no palpable adenopathy in either the right or left lateral (Level II, III, IV) neck, or the central compartment (Level VI and VII). Excellent hemostasis was confirmed along with two viable appearing left parathyroid glands, and two viable appearing right parathyroid glands. Excellent hemostasis in the operative field was re-confirmed under Valsalva maneuver. Surgicel was placed in the operative field as a hemostatic adjunct. Abilio absorbable surgical hemostatic powder was also placed in the operative field as a hemostatic adjunct. The strap musculature (sternothyroid and sternohyoid muscles) was re-approximated in the midline with running 3-0 Vicryl suture. A small space was left inferiorly between the strap muscles in the case of post-operative bleeding. The platysma muscle was reconstituted with running absorbable (3-0 Vicryl) suture   The wound was irrigated with saline solution. The skin incision was closed with running absorbable subcuticular suture (4-0 Monocryl). This was an uncomplicated operation. The patient was extubated uneventfully in the operating room and transferred to the PACU in stable condition with aspiration precautions in effect   Complications: None   Implants: None  Disposition:  To PACU extubated and in stable condition with aspiration precautions in effect   Sandra TIPTONA FACS   Lucille Burgos MD

## 2019-10-25 NOTE — OP NOTES
Date of Procedure: 25 October 2019    Pre-operative Diagnosis: 43-year-old euthyroid female with symptomatic multi-nodular goiter; US shows 2 right thyroid and 1 left thyroid nodule that are TI-RADS 4 and one isthmus nodule that is TI-RADS 3. Post-operative Diagnosis: 43-year-old euthyroid female with symptomatic multi-nodular goiter; US shows 2 right thyroid and 1 left thyroid nodule that are TI-RADS 4 and one isthmus nodule that is TI-RADS 3. Procedure(s):   Neck exploration   Total Thyroidectomy  Laryngeal nerve monitoring  Surgeon(s) and Role:   * Lucille Burgos MD - Co-surgeon  * Sandra Suarez MD, CONOR FACS - Co-surgeon   Anesthesia: General endotracheal anesthesia (GETA)  Urine output: Not documented  Estimated Blood Loss: 2ml   IVF: 600 ml crystalloid             Drains: None  Patient in room at 0743 hours  Antibiotic prophylaxis: ANCEF 2 g at 0759 hours  Preliminary time out at 0755 hours  Beta blocker not indicated prior to operation  Prayer at 0807 hours  Time out for surgery at 0807 hours    (During the time out for surgery the correct patient, operative site and procedure were confirmed, along with having the necessary equipment on hand to perform the operation safely)  Start of surgery at 0808 hours   End of surgery at 0854 hours   VTE prophylaxis with bilateral lower extremity compression devices   Pressure points padded   Sponge, sharp and instrument count: Correct    History:   43-year-old euthyroid female with symptomatic multi-nodular goiter  Symptoms include sore throat, odynophagia and swollen glands as well as neck stiffness    No history of head or neck radiation. No personal or family history of thyroid cancer. No personal history of hyper- or hypo-thyroidism    07/2019    THYROID ULTRASOUND:     CLINICAL HISTORY:  Enlarged thyroid. Sonographic evaluation of the thyroid. FINDINGS:  The right lobe of the thyroid measures 2.8 x 3.9 x 4.8 cm.   The left lobe measures 4.6 x 1.5 x 1.8 cm. The isthmus is 3.9 mm in thickness. Within the right lobe there are two nodules; one in the upper pole measures 3.2 x 1.5 x 3.0 cm in size. The nodule is wider than it is tall. It is isoechoic and does contain internal vascularity with no definite calcifications. The second nodule in the right lobe is in the lower pole measuring 3.0 x 2.4 x 2.5 cm. This nodule is also wider than it is tall, is well circumscribed, isoechoic and does contain some small echogenic foci possibly representing small calcifications. Internal vascularity is seen. Within the left lobe of the thyroid is a solitary nodule measuring 2.0 x 1.2 x 1.6 cm in the mid pole. The nodule is hypoechoic and does contain some cystic areas within. The nodule is also wider than it is tall. It is well circumscribed and contains internal vascularity with no definite calcifications. In the isthmus is a small slightly echogenic nodule measuring 8 x 8 x 9 mm in size. The nodule is wider than it is tall and contains a hypoechoic well circumscribed rim with no calcifications. Internal vascularity is seen. IMPRESSION:  1. THE TWO NODULES IN THE RIGHT LOBE OF THE THYROID ARE TI-RADS 4.  GIVEN THEIR SIZE, FINE NEEDLE ASPIRATION BIOPSY IS RECOMMENDED. 2. THE NODULE IN THE LEFT LOBE OF THE THYROID IS ALSO TI-RADS 4. FINE NEEDLE ASPIRATION BIOPSY IS RECOMMENDED. THE NODULE IN THE ISTHMUS IS TI-RADS 3. FOLLOW-UP IS RECOMMENDED. Procedure (s) performed:   Neck exploration   Total Thyroidectomy  Laryngeal nerve monitoring  Specimens: Total Thyroidectomy - single short stitch right superior thyroid pole; double stitch anterior isthmus  Findings:    Multinodular goiter   Encapsulated right superior thyroid nodule 3.0 x 1.5 cm   Encapsulated 3.0 x 2.5 cm right lower thyroid nodule   Encapsulated 2.0 x 1.5 cm left thyroid nodule   Encapsulated sub-centimeter isthmus nodule   The right thyroid lobe measures 5.0 x 4.0 cm.  The left thyroid lobe measures 4.5 x 2.0 cm.  The thyroid isthmus measures 3 mm in thickness.  No palpable central or lateral neck adenopathy.  Two normal appearing parathyroid glands (right superior and right inferior) identified and preserved on a vascular pedicle.  Two normal appearing parathyroid glands (left superior and left inferior) identified and preserved on a vascular pedicle.  Both recurrent laryngeal nerves were identified and carefully preserved throughout the entire course of the operation    Both recurrent laryngeal nerves were confirmed to be structurally and functionally intact - audible signal attained with nerve stimulator / NIM system when applied to both the right and left recurrent laryngeal nerve    Excellent hemostasis confirmed at end of operation  Pre-op holding area:   Patient seen in pre-operative holding area   Operative site marked on anterior neck  Patient was informed of the indications, nature, risks, benefits, alternatives and expected outcomes of the proposed operation:   Neck Exploration, Laryngeal Neuromonitoring, Total Thyroidectomy, possible central neck dissection  The patient voiced understanding of the aforesaid and provided informed consent to proceed   The patient asked pertinent questions, all of which were answered to her apparent satisfaction       Operative Procedure: The patient was escorted to the operating room   Upon arrival to the operative room, the patient, procedure, and operative site were confirmed via a pre-operative time-out. During the time out for surgery the correct patient, operative site and procedure were confirmed, along with having the necessary equipment on hand to perform the operation safely. All in attendance were in agreement. The patient was placed in the supine position and general anesthesia induced without incident.    General endotracheal anesthesia was induced with endotracheal placement of NIM tube in order to facilitate laryngeal neuro-monitoring. The eyes were taped shut. Prophylactic antibiotic (ANCEF 2 g) was administered prior to the procedure. Beta blockade was not indicated prior to operation   With the patient in the supine position the arms were tucked, a pillow was placed behind the knees and the heels padded; the neck was slightly extended, and table elevated to 30 degrees. A roll was placed behind the scapulae to create mild degree of neck flexion  The occiput was placed on a cushion. Subcutaneous NIM monitoring system leads were placed. Pressure ulcer prophylaxis was in effect with pressure point padding. VTE prophylaxis was also in effect with lower extremity mechanical compression devices   Sterile anterior neck/sternum prep (Chlorhexidine - alcohol) and drape   We prayed for our patient and we repeated the TIME OUT prior to commencing the operation to confirm the    correct patient,    correct operative site,    correct operative procedure and    necessary equipment on hand to conduct the operation safely. Local anesthesia (50:50 mix of 1% LIDO w/ EPI - 6 ml) infiltrated subcutaneously at site of proposed anterior cervical skin incision   Low anterior, transverse cervical incision was made, 5 cm in length, two finger breadths superior to the sternoclavicular joints, and parallel to the normal skin lines of the neck. The skin, subcutaneous tissue and platysma muscle were divided   Sub-platysmal flaps were created by dissecting in the avascular plane superiorly to the thyroid cartilage and inferiorly to the suprasternal notch. Strap musculature (sternohyoid muscle and sternothyroid muscle) was  in the avascular midline (by incising the cervical linea alba), the incision extending from the suprasternal notch to the thyroid cartilage. The anterior jugular veins were ligated and divided.   The anterior jugular veins empty into the jugular arch low in the neck anterior to the strap musculature. The jugular arch was ligated and divided in the space of Burns. A plane of dissection was developed between the posterior surface of the strap musculature (sternothyroid muscle) and the anterior surface of the thyroid gland. The right lateral thyroid recess was developed using gentle blunt dissection. The right thyroid lobe was gently lifted anteriorly. The right middle thyroid vein was isolated (no non-recurrent laryngeal nerve was observed). The right middle thyroid vein was controlled, ligated and divided with Harmonic scalpel on the right thyroid capsule. We then focused our attention on the right superior thyroid pole  The space was developed between the right cricothyroid muscle and medial aspect of right superior thyroid pole. Recognizing that variations exist between the course of the superior laryngeal nerves and the first branch of the external carotid artery (the superior thyroid artery), we took great care to dissect the superior thyroid artery and its branches prior to ligation. We were careful to keep the external branch of the superior laryngeal nerve (EBSLN, which provides motor fibers to the cricothyroid muscle and the inferior pharyngeal constrictor muscle) out of harms way. The right thyroid lobe was retracted in a caudal and lateral direction. The right superior pole vessels (right superior thyroid artery and right superior thyroid vein) were skeletonized and meticulously controlled individually (with Harmonic scalpel division) on the thyroid capsule in order to avoid injury to the right superior laryngeal nerve's external branch. The right recurrent laryngeal nerve, which originates from the vagus nerve, loops around the right subclavian artery and ascends in the neck between the trachea and esophagus.    The right recurrent laryngeal nerve was palpable and visually identified in the right trachea-esophageal (T-E) groove in proximity to the right inferior thyroid artery and right inferior thyroid vein. The right recurrent laryngeal nerves terminal branches provide motor innervation to all intrinsic muscles of the larynx except the cricothyroid muscle - innervated by EBSLN. The branches of the right inferior thyroid artery and branches of the right inferior thyroid vein were carefully dissected while maintaining the encountered right recurrent laryngeal nerve in view and out of harms way. The right inferior thyroid artery sends branches to both the superior and inferior parathyroid glands. The superior parathyroid gland (usually encountered at the Ligament of Berry lateral to the recurrent nerve) is more posterior in position relative to the more anteriorly situated inferior parathyroid gland (usually encountered anterior to the recurrent nerve, inferior to where the nerve crosses the inferior thyroid artery). The right inferior thyroid artery branches were controlled on the thyroid capsule after its identified parathyroid branches were given off. Branches of the right inferior thyroid vein were controlled on the thyroid capsule with Harmonic scalpel in order to avoid injury to the right inferior parathyroid gland that was identified anterior and medial to the right recurrent laryngeal nerve. Both the inferior parathyroid gland and right recurrent laryngeal nerve were meticulously preserved intact and maintained out of harms way  The right inferior parathyroid gland was normal in size and gross appearance, and well perfused with its vasculature carefully preserved intact. Diligent search for the right superior parathyroid gland superior to the middle thyroid vein and posterior and lateral to the right recurrent laryngeal nerve identified a normal appearing, well perfused right superior parathyroid gland, which was also carefully maintained out of harms way. The right superior parathyroid gland was carefully preserved on its vascular pedicle.    The right tubercle of Zukerkandl of the thyroid was identified and gently dissected free of both identified branches of the right recurrent laryngeal nerve. We recognize that this particular area where the right recurrent laryngeal nerve most closely approximates the thyroid gland and the terminal ascending branches of the inferior thyroid artery is where the recurrent nerve is at greatest risk of injury. The terminal branches of the right recurrent nerve may be non-recurrent (rarely), or may be anterior, posterior or in between the branches of the ascending right inferior thyroid artery. The Ligament of Rosalea Barn was identified at the posterolateral portion of the thyroid lobe just caudal to the cricoid cartilage, and it was gently dissected in order to identify its relationship to the terminal aspects of the right recurrent laryngeal nerve, which was found to be passing posterior to the Ligament of Mayes to gain entry into the larynx. The terminal branches of the right inferior thyroid artery were meticulously controlled with bi-polar cautery taking care to spare the adjacent right recurrent laryngeal nerve. Both terminal right recurrent laryngeal nerve branches were identified and meticulously preserved, as they were seen passing under the inferior constrictor muscle, and through the cricothyroid membrane near the right cricothyroid joint, just superior to the cricoid cartilage, to enter the posterior and medial aspect of the larynx. The right thyroid lobe was further mobilized with care to the left. The attachments of the posterior right thyroid lobe and isthmus to the pre-tracheal fascia were divided with precision taking great care to avoid injury to the terminal branches of the right recurrent laryngeal nerve, which takes a more oblique course when compared to the more cephalo-caudad orientation of the recurrent laryngeal nerve on the left side of the neck, which also courses within the TE groove.    The structural and functional integrity of the right recurrent laryngeal nerve was confirmed with the nerve stimulator ([employer] system), as a prominent, audible signal was attained with stimulation of the right recurrent nerve in its proximal, mid and distal cervical portions. Excellent hemostasis was confirmed along with normal superior, and inferior viable appearing/well perfused right parathyroid glands. Attention was then directed to the left neck. The same surgical principles and maneuvers were applied to the contralateral neck to complete the total thyroidectomy, briefly:  The left lateral thyroid recess was developed posterior to the left sternothyroid muscle utilizing gentle blunt dissection. The left middle thyroid was identified, dissected, ligated and divided with the Harmonic scalpel. The space between the left cricothyroid muscle and medial aspect of the left superior thyroid pole was developed using gentle dissection. The left superior thyroid pole vessels (left superior thyroid artery and left superior thyroid vein) were meticulously controlled on the thyroid capsule with Harmonic scalpel ligation and division, taking great care to avoid injury to the left superior laryngeal nerve's external branch. The left thyroid lobe was gently mobilized anteromedially to the right. The left T-E groove was gently dissected. The left recurrent laryngeal nerve was encountered in proximity to the left inferior thyroid artery. The branches of the left inferior thyroid artery and left inferior thyroid vein were carefully controlled on the thyroid capsule in order to avoid injury and maintain blood supply to the identified, normal appearing left superior and inferior parathyroid glands. The well perfused, normal appearing left superior parathyroid gland was situated cephalad of the left middle thyroid vein, and posterior and lateral to the encountered left recurrent laryngeal nerve.    Dissection anterior and medial to the left recurrent laryngeal nerve and near the left inferior thyroid pole identified a well perfused, normal appearing left inferior parathyroid gland. Both parathyroid glands appeared viable/well perfused, and were carefully preserved on their respective vascular pedicles, intact throughout the entire course of our dissection. The superior and medial aspect of the left thyroid lobe was dissected free of both identified branches of the left recurrent laryngeal nerve. Both branches of the left recurrent laryngeal nerve were seen terminating near the left cricothyroid joint, and both recurrent laryngeal nerve branches were carefully preserved. The Tubercle of Zukerkandl of the left thyroid lobe was identified and gently dissected free of both identified branches of the left recurrent laryngeal nerve. The attachments of the posterior left thyroid lobe to the trachea were divided taking care not injure the clearly visualized terminal branches of the left recurrent laryngeal nerve. The total thyroidectomy specimen was delivered, oriented for pathological processing (single stitch right upper thyroid pole, double stitch anterior isthmus) and submitted for permanent pathology analysis. The structural and functional integrity of the left recurrent laryngeal nerve, and the right recurrent laryngeal nerve was confirmed with the nerve stimulator ([employer]), as a prominent audible signal was attained when both branches of the left and right recurrent laryngeal nerves were stimulated. A diligent search of the central neck and both lateral areas of the neck was unrevealing; specifically, there was no palpable adenopathy in either the right or left lateral (Level II, III, IV) neck, or the central compartment (Level VI and VII). Excellent hemostasis was confirmed along with two viable appearing left parathyroid glands, and two viable appearing right parathyroid glands.    Excellent hemostasis in the operative field was re-confirmed under Valsalva maneuver. Surgicel was placed in the operative field as a hemostatic adjunct. Abilio absorbable surgical hemostatic powder was also placed in the operative field as a hemostatic adjunct. The strap musculature (sternothyroid and sternohyoid muscles) was re-approximated in the midline with running 3-0 Vicryl suture. A small space was left inferiorly between the strap muscles in the case of post-operative bleeding. The platysma muscle was reconstituted with running absorbable (3-0 Vicryl) suture   The wound was irrigated with saline solution. The skin incision was closed with running absorbable subcuticular suture (4-0 Monocryl). This was an uncomplicated operation. The patient was extubated uneventfully in the operating room and transferred to the PACU in stable condition with aspiration precautions in effect   Complications: None   Implants: None  Disposition:  To PACU extubated and in stable condition with aspiration precautions in effect   Kaye TIPTONA [facility]   Bautista German MD

## 2019-10-25 NOTE — ROUTINE PROCESS
Patient: Emily Guthrie MRN: [id number]  SSN: [id number]   YOB: 1960  Age: 60 y.o. Sex: female     Patient is status post Procedure(s):  TOTAL THYROIDECTOMY, neck exploration, nim. Surgeon(s) and Role:     Julia Qureshi MD - Primary    Local/Dose/Irrigation:  6ml                  Peripheral IV 10/25/19 Left Wrist (Active)   Site Assessment Clean, dry, & intact 10/25/2019  6:49 AM   Dressing Status Clean, dry, & intact 10/25/2019  6:49 AM   Dressing Type Transparent 10/25/2019  6:49 AM            Airway - Endotracheal Tube 10/25/19 Oral (Active)                   Dressing/Packing:  Wound Neck anterior-Dressing Type: Adhesive wound closure strips (Steri-Strips); Adhesive wound dressing (Mastisol) (10/25/19 0700)    Splint/Cast:  ]    Other:

## 2019-10-25 NOTE — PROGRESS NOTES
Bedside and Verbal shift change report given to Dereck Howell (oncoming nurse) by Javier Real RN (offgoing nurse). Report included the following information SBAR, Kardex, Intake/Output, MAR and Accordion.

## 2019-10-25 NOTE — ANESTHESIA POSTPROCEDURE EVALUATION
Procedure(s):  TOTAL THYROIDECTOMY, neck exploration, nim. general    Anesthesia Post Evaluation      Multimodal analgesia: multimodal analgesia used between 6 hours prior to anesthesia start to PACU discharge  Patient location during evaluation: bedside  Patient participation: waiting for patient participation  Level of consciousness: awake  Pain management: adequate  Airway patency: patent  Anesthetic complications: no  Cardiovascular status: acceptable  Respiratory status: unassisted  Hydration status: acceptable  Comments: Post-Anesthesia Evaluation and Assessment    I have evaluated the patient and they are ready for PACU discharge. Patient: Megha Bhatti MRN: [id number]  SSN: [id number]   YOB: 1960  Age: 60 y.o. Sex: female      Cardiovascular Function/Vital Signs  /69 (BP 1 Location: Right arm, BP Patient Position: At rest;Supine)   Pulse 67   Temp 36.7 °C (98 °F)   Resp 10   Ht 5' 1\" (1.549 m)   Wt 54 kg (119 lb)   SpO2 97%   BMI 22.48 kg/m²     Patient is status post General anesthesia for Procedure(s):  TOTAL THYROIDECTOMY, neck exploration, nim. Nausea/Vomiting: None    Postoperative hydration reviewed and adequate.     Pain:  Pain Scale 1: Numeric (0 - 10) (10/25/19 1000)  Pain Intensity 1: 3(Patient states pain is tolerable) (10/25/19 1000)   Managed    Neurological Status:   Neuro (WDL): Within Defined Limits (10/25/19 1000)  Neuro  Neurologic State: Sleeping (10/25/19 1000)  Orientation Level: Oriented to person;Oriented to place;Oriented to situation (10/25/19 1000)  LUE Motor Response: Purposeful (10/25/19 1000)  LLE Motor Response: Purposeful (10/25/19 1000)  RUE Motor Response: Purposeful (10/25/19 1000)  RLE Motor Response: Purposeful (10/25/19 1000)   At baseline    Mental Status, Level of Consciousness: Alert and  oriented to person, place, and time    Pulmonary Status:   O2 Device: Room air (10/25/19 1000)   Adequate oxygenation and airway patent    Complications related to anesthesia: None    Post-anesthesia assessment completed. No concerns    Signed By: Sherrie Genao MD    October 25, 2019                   Vitals Value Taken Time   /69 10/25/2019 10:00 AM   Temp 36.7 °C (98 °F) 10/25/2019  9:23 AM   Pulse 67 10/25/2019 10:10 AM   Resp 13 10/25/2019 10:10 AM   SpO2 100 % 10/25/2019 10:10 AM   Vitals shown include unvalidated device data.

## 2019-10-26 VITALS
TEMPERATURE: 99 F | OXYGEN SATURATION: 100 % | SYSTOLIC BLOOD PRESSURE: 140 MMHG | RESPIRATION RATE: 18 BRPM | WEIGHT: 119 LBS | HEIGHT: 61 IN | HEART RATE: 60 BPM | BODY MASS INDEX: 22.47 KG/M2 | DIASTOLIC BLOOD PRESSURE: 70 MMHG

## 2019-10-26 LAB
CALCIUM SERPL-MCNC: 9.8 MG/DL (ref 8.5–10.1)
MAGNESIUM SERPL-MCNC: 2.2 MG/DL (ref 1.6–2.4)
PHOSPHATE SERPL-MCNC: 4.5 MG/DL (ref 2.6–4.7)

## 2019-10-26 PROCEDURE — 84100 ASSAY OF PHOSPHORUS: CPT

## 2019-10-26 PROCEDURE — 82310 ASSAY OF CALCIUM: CPT

## 2019-10-26 PROCEDURE — 36416 COLLJ CAPILLARY BLOOD SPEC: CPT

## 2019-10-26 PROCEDURE — 74011250637 HC RX REV CODE- 250/637: Performed by: OTOLARYNGOLOGY

## 2019-10-26 PROCEDURE — 83735 ASSAY OF MAGNESIUM: CPT

## 2019-10-26 PROCEDURE — 99218 HC RM OBSERVATION: CPT

## 2019-10-26 RX ORDER — LEVOTHYROXINE SODIUM 112 UG/1
112 TABLET ORAL DAILY
Qty: 90 TAB | Refills: 3 | Status: SHIPPED | OUTPATIENT
Start: 2019-10-26 | End: 2020-12-09

## 2019-10-26 RX ORDER — ONDANSETRON 8 MG/1
4-8 TABLET, ORALLY DISINTEGRATING ORAL
Qty: 15 TAB | Refills: 1 | Status: SHIPPED | OUTPATIENT
Start: 2019-10-26 | End: 2021-02-10

## 2019-10-26 RX ORDER — FERROUS SULFATE, DRIED 160(50) MG
2 TABLET, EXTENDED RELEASE ORAL EVERY 6 HOURS
Qty: 250 TAB | Refills: 3 | Status: SHIPPED | OUTPATIENT
Start: 2019-10-26 | End: 2019-11-25

## 2019-10-26 RX ORDER — FERROUS SULFATE, DRIED 160(50) MG
2 TABLET, EXTENDED RELEASE ORAL EVERY 6 HOURS
Status: DISCONTINUED | OUTPATIENT
Start: 2019-10-26 | End: 2019-10-26 | Stop reason: HOSPADM

## 2019-10-26 RX ORDER — OXYCODONE AND ACETAMINOPHEN 5; 325 MG/1; MG/1
1 TABLET ORAL
Qty: 42 TAB | Refills: 0 | Status: SHIPPED | OUTPATIENT
Start: 2019-10-26 | End: 2019-11-02

## 2019-10-26 RX ADMIN — LEVOTHYROXINE SODIUM 112 MCG: 112 TABLET ORAL at 07:08

## 2019-10-26 RX ADMIN — CALCIUM CARBONATE-VITAMIN D TAB 500 MG-200 UNIT 2 TABLET: 500-200 TAB at 00:04

## 2019-10-26 RX ADMIN — ACETAMINOPHEN 650 MG: 325 TABLET, FILM COATED ORAL at 08:21

## 2019-10-26 RX ADMIN — OXYCODONE HYDROCHLORIDE AND ACETAMINOPHEN 1 TABLET: 10; 325 TABLET ORAL at 04:23

## 2019-10-26 RX ADMIN — ACETAMINOPHEN 650 MG: 325 TABLET, FILM COATED ORAL at 02:50

## 2019-10-26 RX ADMIN — OYSTER SHELL CALCIUM WITH VITAMIN D 2 TABLET: 500; 200 TABLET, FILM COATED ORAL at 06:08

## 2019-10-26 RX ADMIN — OYSTER SHELL CALCIUM WITH VITAMIN D 2 TABLET: 500; 200 TABLET, FILM COATED ORAL at 12:22

## 2019-10-26 NOTE — DISCHARGE INSTRUCTIONS
Post Thyroidectomy Instructions    Follow up: with Dr. Renuka Nathan 4 weeks after surgery   Shortly after surgery call [phone] to schedule this appointment. You may remove your steristrips after 2 weeks    If you are not tingling in your fingers or around your mouth 3 days after surgery, then you may start your calcium taper - after 1 tablets by mouth every 6 hours start  1 tablet by mouth every 8 hours for 3 days then  1 tablet by mouth every 12 hours for 3 days then stop  If tingling starts resume the prior dosage     Eat regular foods.  You may shower in 24 hours. Do not allow direct water pressure on your wound. If water trickles down while washing your hair, allow the wound to dry on its own.  Do not scrub or soak your wound for 2 weeks or 14 days.  No strenuous activity: for 14 days.  No moving more than 15 pounds: for 14 days. Then includes pulling, pushing, tugging, throwing.  There is generally not a lot of pain: with this surgery. Take your pain medication as needed. Most patients, if they do have pain, will have neck stiffness/discomfort. You may have numbness or tingling surrounding the area of your wound. Narcotics can cause constipation; use your Colace if this is the case.  Nausea and vomiting: from lingering effects of general anesthesia usually resolves by the following day. The narcotic pain medication can cause nausea and vomiting. They should be taken with food or fluids to minimize this. Medications that reduce nausea and vomiting can be prescribed by your physician.  Fever above 100.4, redness around wound, pus drainage from wound: call your doctor.  Bleeding: is uncommon (less than 1%). If it does occur your neck will develop a fullness. It is good to take a look at your neck shortly after surgery to see what a baseline appearance is. If there are changes to this, then call your provider. \    BTAP  if concerns - a provider is on call 24/7     Special Instructions for if you had both sides of your thyroid removed or the remainder of your thyroid removed:    o You may be on Calcium ([place]) - it is very important that you take this. Dr. Abigail Yoo will start a taper at your follow up visit  o If you experience tingling in the hands or around your mouth, your calcium may be dropping, go to the emergency room immediately and tell them you had your thyroid removed.  o You will be started on a dose of thyroid hormone replacement if you did not have high levels of thyroid hormone prior to surgery. Take this every day.     Additional Recommendations:   - Check BP 1-2 x daily or as needed (if having headaches or dizzy) and keep record for your PCP to review  - follow a reduced sodium, or no added salt diet    May appointment to follow up with your PCP as soon as you return home to [facility]

## 2019-10-26 NOTE — PROGRESS NOTES
Called and updated Claritza Goss NP on pt's vitals signs this am and condition. New orders received. NP said she would be up to see pt.

## 2019-10-26 NOTE — PROGRESS NOTES
NP [facility] notified of /51 in r arm and /67 in L arm. NP instructed to give Pt.  Tylenol for c/o headache and to call if headache worsens or  or higher or  or higher

## 2019-10-26 NOTE — PROGRESS NOTES
Hospitalist Progress Note  Tanisha Francisco NP  Answering service: [phone] from in house phone  [phone]   Date of Service:  10/26/2019  NAME:  Anabel Manuel  :  1960  MRN:  [id number]    Admission Summary:   Pt admitted for thyroidectomy and was placed in observation post-op. Pt had episode of hypertension and hospitalists consulted to evaluate. Notes from PCP indicate pt has had evidence of hypertension in the office but on instruction to check BPs at home, they seemed to be normal. She reports a consistent reading of <130 SBP at home. Denies any other PMHx: no stroke, seizure, HA, No respiratory or heart disease. No renal of GI disease. No urinary problems. No hx diabetes. Interval history / Subjective:   Pt in bed ordering lunch - feeling well, pain controlled. Assessment & Plan:     Hypertension  - not on any meds at home and pt resistant to starting  - BP fluctuating but ok 140/70 on last check  - discussed with pt, will hold off on starting BP medications as her PCP has been monitoring this. Suggested she continue to check BP 1-2 times daily and keeping a record for her next PCP appointment. We discussed the long term health risks (and short term) of uncontrolled or persistent hypertension and to be open to possibly starting low dose medication soon if it is advised.      Post Thyroidectomy: treatment as per primary team     Code status: Full  DVT prophylaxis: SCDs  Care Plan discussed with: Patient, RN, attending MD  Disposition: Pt to be discharged today     Hospital Problems  Date Reviewed: 9/10/2019          Codes Class Noted POA    Status post surgery ICD-10-CM: Z98.890  ICD-9-CM: V45.89  10/25/2019 Unknown        Multinodular goiter ICD-10-CM: E04.2  ICD-9-CM: 241.1  10/25/2019 Unknown            Review of Systems:   Denies any pain, no SOB. Appetite fair.     Vital Signs:   Last 24hrs VS reviewed since prior progress note. Most recent are:  Visit Vitals  /70 (BP 1 Location: Right arm, BP Patient Position: At rest)   Pulse 60   Temp 99 °F (37.2 °C)   Resp 18   Ht 5' 1\" (1.549 m)   Wt 54 kg (119 lb)   SpO2 100%   BMI 22.48 kg/m²       Intake/Output Summary (Last 24 hours) at 10/26/2019 1041  Last data filed at 10/25/2019 1803  Gross per 24 hour   Intake 610 ml   Output 1100 ml   Net -490 ml        Physical Examination:        Constitutional:  No acute distress, cooperative, pleasant    ENT:  Oral MM moist, bandage to neck clean dry and intact, no swelling noted   Resp:  no accessory muscle use, on RA    GI:  No N/V. Fair appetite    Neurologic:  Moves all extremities. AAOx3. Psych: Calm and cooperative                Data Review:   Review and/or order of clinical lab test  Review and/or order of tests in the medicine section of [facility]    Labs:   No results for input(s): WBC, HGB, HCT, PLT, HGBEXT, HCTEXT, PLTEXT in the last 72 hours. Recent Labs     10/26/19  0614 10/25/19  2212   CA 9.8 9.4   MG 2.2 2.0   PHOS 4.5 3.7     No results for input(s): SGOT, GPT, ALT, AP, TBIL, TBILI, TP, ALB, GLOB, GGT, AML, LPSE in the last 72 hours. No lab exists for component: AMYP, HLPSE  No results for input(s): INR, PTP, APTT, INREXT in the last 72 hours. No results for input(s): FE, TIBC, PSAT, FERR in the last 72 hours. No results found for: FOL, RBCF   No results for input(s): PH, PCO2, PO2 in the last 72 hours. No results for input(s): CPK, CKNDX, TROIQ in the last 72 hours.     No lab exists for component: CPKMB  No results found for: CHOL, CHOLX, CHLST, CHOLV, HDL, HDLP, LDL, LDLC, DLDLP, TGLX, TRIGL, TRIGP, CHHD, CHHDX  No results found for: GLUCPOC  No results found for: COLOR, APPRN, SPGRU, REFSG, JOSÉ MANUEL, PROTU, GLUCU, KETU, BILU, UROU, GABI, LEUKU, GLUKE, EPSU, BACTU, WBCU, RBCU, CASTS, UCRY    Medications Reviewed:     Current Facility-Administered Medications   Medication Dose Route Frequency    calcium-vitamin D (OS-KASSIE) 500 mg-200 unit tablet  2 Tab Oral Q6H    citalopram (CELEXA) tablet 20 mg  20 mg Oral DAILY    LORazepam (ATIVAN) tablet 0.5 mg  0.5 mg Oral Q6H PRN    sodium chloride (NS) flush 5-40 mL  5-40 mL IntraVENous Q8H    sodium chloride (NS) flush 5-40 mL  5-40 mL IntraVENous PRN    dextrose 5% - 0.45% NaCl with KCl 20 mEq/L infusion  25 mL/hr IntraVENous CONTINUOUS    acetaminophen (TYLENOL) tablet 650 mg  650 mg Oral Q6H PRN    oxyCODONE-acetaminophen (PERCOCET) 5-325 mg per tablet 1 Tab  1 Tab Oral Q4H PRN    oxyCODONE-acetaminophen (PERCOCET 10)  mg per tablet 1 Tab  1 Tab Oral Q4H PRN    ondansetron (ZOFRAN) injection 4 mg  4 mg IntraVENous Q4H PRN    levothyroxine (SYNTHROID) tablet 112 mcg  112 mcg Oral DAILY    calcium-vitamin D (OS-KASSIE) 500 mg-200 unit tablet  2 Tab Oral Q6H    guaiFENesin ER (MUCINEX) tablet 600 mg  600 mg Oral Q12H PRN   ______________________________________________________________________  EXPECTED LENGTH OF STAY: - - -  ACTUAL LENGTH OF STAY:          0               Serg Smith NP

## 2019-10-26 NOTE — CONSULTS
Hospitalist Consult Note  Young Singleton NP  Answering service: [phone] OR [phone] from in house phone  [phone]      Date of Service:  10/25/2019  NAME:  Gordon Manuel  :  1960  MRN:  [id number]    Admission Summary:   Pt admitted for thyroidectomy and was placed in observation post-op. Pt had episode of hypertension and hospitalists consulted to evaluate. Pt responded well to IV dose of hydralazine ~ 2 hours after IV push, pts BP down to 145/74. Reviewed pts notes from PCP - she has had evidence of hypertension in the office but on instruction to check BPs at home, they seemed to be normal. She reports a consistent reading of <130 SBP at home. Denies any other PMHx: no stroke, seizure, HA, No respiratory or heart disease. No renal of GI disease. No urinary problems. No hx diabetes. + flu shot this year  - alcohol  - tobacco  - illicit drug use    Interval history / Subjective:   Pt in bed, reports no problems and pain is controlled. Ambulated to bathroom and assisted back into bed.       Assessment & Plan:     Hypertension  - not on any meds at home and pt resistant to starting  - BP better this afternoon (she did receive a dose of hydralazine earlier)  - discussed with pt, will monitor BP overnight, make decision about starting medication in am, pt will be staying in [place] for ~ 1 month and not have PCP follow up but she did bring her BP cuff with her so can check BPs at home  - plan to re-assess BP in am, may need to start a low dose amlodipine to better control BP    Post Thyroidectomy: treatment as per primary team    Code status: Full  DVT prophylaxis: SCDs  Care Plan discussed with: Patient, RN, attending MD  Disposition: [address] Problems  Date Reviewed: 9/10/2019          Codes Class Noted POA    Status post surgery ICD-10-CM: Z98.890  ICD-9-CM: V45.89  10/25/2019 Unknown        Multinodular goiter ICD-10-CM: E04.2  ICD-9-CM: 241.1  10/25/2019 Unknown            Review of Systems:   Denies HA.   + mild neck dscomfort  No chest pain, pressure palpitations  No shortness of breath, cough  No constipation, diarrhea, abdominal pain  No urinary complaints: no dysuria  No paresthesias, numbness, weakness, gait abnormality  No anxiety    Vital Signs:    Last 24hrs VS reviewed since prior progress note. Most recent are:  Visit Vitals  /75 (BP 1 Location: Left arm, BP Patient Position: At rest)   Pulse 77   Temp 99.9 °F (37.7 °C)   Resp 16   Ht 5' 1\" (1.549 m)   Wt 54 kg (119 lb)   SpO2 100%   BMI 22.48 kg/m²       Intake/Output Summary (Last 24 hours) at 10/25/2019 2007  Last data filed at 10/25/2019 1803  Gross per 24 hour   Intake 1510 ml   Output 1960 ml   Net -450 ml      Physical Examination:         Constitutional:  No acute distress, cooperative, pleasant    ENT:  Oral MM moist, bandage to neck clean dry and intact, no sweeling   Resp:  rhonchi but + cough after ambulation   CV:  Regular rhythm, normal rate, no murmurs    GI:  Soft, non distended, non tender. normoactive bowel sounds   :  Urine clear and yellow    Musculoskeletal:  No edema, warm, 2+ pulses throughout    Neurologic:  Moves all extremities. AAOx3, CN II-XII reviewed      Data Review:   Review and/or order of clinical lab test  Review and/or order of tests in the radiology section of [facility]  Review and/or order of tests in the medicine section of [facility]    Labs:   No results for input(s): WBC, HGB, HCT, PLT, HGBEXT, HCTEXT, PLTEXT in the last 72 hours. No results for input(s): NA, K, CL, CO2, BUN, CREA, GLU, CA, MG, PHOS, URICA in the last 72 hours. No results for input(s): SGOT, GPT, ALT, AP, TBIL, TBILI, TP, ALB, GLOB, GGT, AML, LPSE in the last 72 hours. No lab exists for component: AMYP, HLPSE  No results for input(s): INR, PTP, APTT, INREXT in the last 72 hours. No results for input(s): FE, TIBC, PSAT, FERR in the last 72 hours. No results found for: FOL, RBCF   No results for input(s): PH, PCO2, PO2 in the last 72 hours. No results for input(s): CPK, CKNDX, TROIQ in the last 72 hours.     No lab exists for component: CPKMB  No results found for: CHOL, CHOLX, CHLST, CHOLV, HDL, HDLP, LDL, LDLC, DLDLP, TGLX, TRIGL, TRIGP, CHHD, CHHDX  No results found for: GLUCPOC  No results found for: COLOR, APPRN, SPGRU, REFSG, JOSÉ MANUEL, PROTU, GLUCU, KETU, BILU, UROU, GABI, LEUKU, GLUKE, EPSU, BACTU, WBCU, RBCU, CASTS, UCRY    Medications Reviewed:     Current Facility-Administered Medications   Medication Dose Route Frequency    [START ON 10/26/2019] citalopram (CELEXA) tablet 20 mg  20 mg Oral DAILY    LORazepam (ATIVAN) tablet 0.5 mg  0.5 mg Oral Q6H PRN    sodium chloride (NS) flush 5-40 mL  5-40 mL IntraVENous Q8H    sodium chloride (NS) flush 5-40 mL  5-40 mL IntraVENous PRN    dextrose 5% - 0.45% NaCl with KCl 20 mEq/L infusion  25 mL/hr IntraVENous CONTINUOUS    acetaminophen (TYLENOL) tablet 650 mg  650 mg Oral Q6H PRN    oxyCODONE-acetaminophen (PERCOCET) 5-325 mg per tablet 1 Tab  1 Tab Oral Q4H PRN    oxyCODONE-acetaminophen (PERCOCET 10)  mg per tablet 1 Tab  1 Tab Oral Q4H PRN    ondansetron (ZOFRAN) injection 4 mg  4 mg IntraVENous Q4H PRN    levothyroxine (SYNTHROID) tablet 112 mcg  112 mcg Oral DAILY    calcium-vitamin D (OS-KASSIE) 500 mg-200 unit tablet  2 Tab Oral Q6H    hydrALAZINE (APRESOLINE) 20 mg/mL injection        guaiFENesin ER (MUCINEX) tablet 600 mg  600 mg Oral Q12H PRN   ______________________________________________________________________  EXPECTED LENGTH OF STAY: - - -  ACTUAL LENGTH OF STAY:          0               Robel Rodriguez NP

## 2020-01-14 PROBLEM — G44.221 CHRONIC TENSION-TYPE HEADACHE, INTRACTABLE: Status: ACTIVE | Noted: 2020-01-14

## 2020-02-04 PROBLEM — E04.1 THYROID NODULE: Status: ACTIVE | Noted: 2020-02-04

## 2020-02-04 PROBLEM — R22.1 SENSATION OF LUMP IN THROAT: Status: ACTIVE | Noted: 2020-02-04

## 2020-12-09 PROBLEM — B00.9 HERPES: Status: ACTIVE | Noted: 2020-12-09

## 2021-05-17 ENCOUNTER — OFFICE VISIT (OUTPATIENT)
Dept: FAMILY MEDICINE CLINIC | Age: 61
End: 2021-05-17
Payer: MEDICARE

## 2021-05-17 VITALS
DIASTOLIC BLOOD PRESSURE: 77 MMHG | BODY MASS INDEX: 22.05 KG/M2 | OXYGEN SATURATION: 99 % | RESPIRATION RATE: 16 BRPM | HEART RATE: 69 BPM | SYSTOLIC BLOOD PRESSURE: 160 MMHG | TEMPERATURE: 97.3 F | WEIGHT: 116.8 LBS | HEIGHT: 61 IN

## 2021-05-17 DIAGNOSIS — Z00.00 MEDICARE ANNUAL WELLNESS VISIT, SUBSEQUENT: Primary | ICD-10-CM

## 2021-05-17 DIAGNOSIS — H91.90 HEARING LOSS, UNSPECIFIED HEARING LOSS TYPE, UNSPECIFIED LATERALITY: ICD-10-CM

## 2021-05-17 DIAGNOSIS — K29.70 GASTRITIS WITHOUT BLEEDING, UNSPECIFIED CHRONICITY, UNSPECIFIED GASTRITIS TYPE: ICD-10-CM

## 2021-05-17 DIAGNOSIS — K21.00 GASTROESOPHAGEAL REFLUX DISEASE WITH ESOPHAGITIS, UNSPECIFIED WHETHER HEMORRHAGE: ICD-10-CM

## 2021-05-17 DIAGNOSIS — D50.9 IRON DEFICIENCY ANEMIA, UNSPECIFIED IRON DEFICIENCY ANEMIA TYPE: ICD-10-CM

## 2021-05-17 DIAGNOSIS — J30.9 ALLERGIC SINUSITIS: ICD-10-CM

## 2021-05-17 DIAGNOSIS — Z13.6 SCREENING FOR CARDIOVASCULAR CONDITION: ICD-10-CM

## 2021-05-17 DIAGNOSIS — I10 ESSENTIAL HYPERTENSION: ICD-10-CM

## 2021-05-17 LAB
ALBUMIN SERPL-MCNC: 4.3 G/DL (ref 3.5–5)
ALBUMIN/GLOB SERPL: 1.3 {RATIO} (ref 1.1–2.2)
ALP SERPL-CCNC: 93 U/L (ref 45–117)
ALT SERPL-CCNC: 37 U/L (ref 12–78)
ANION GAP SERPL CALC-SCNC: 7 MMOL/L (ref 5–15)
APPEARANCE UR: CLEAR
AST SERPL-CCNC: 27 U/L (ref 15–37)
BACTERIA URNS QL MICRO: NEGATIVE /HPF
BASOPHILS # BLD: 0 K/UL (ref 0–0.1)
BASOPHILS NFR BLD: 1 % (ref 0–1)
BILIRUB SERPL-MCNC: 0.5 MG/DL (ref 0.2–1)
BILIRUB UR QL: NEGATIVE
BUN SERPL-MCNC: 13 MG/DL (ref 6–20)
BUN/CREAT SERPL: 16 (ref 12–20)
CALCIUM SERPL-MCNC: 9.5 MG/DL (ref 8.5–10.1)
CHLORIDE SERPL-SCNC: 106 MMOL/L (ref 97–108)
CHOLEST SERPL-MCNC: 179 MG/DL
CO2 SERPL-SCNC: 27 MMOL/L (ref 21–32)
COLOR UR: ABNORMAL
CREAT SERPL-MCNC: 0.82 MG/DL (ref 0.55–1.02)
DIFFERENTIAL METHOD BLD: ABNORMAL
EOSINOPHIL # BLD: 0.1 K/UL (ref 0–0.4)
EOSINOPHIL NFR BLD: 1 % (ref 0–7)
EPITH CASTS URNS QL MICRO: ABNORMAL /LPF
ERYTHROCYTE [DISTWIDTH] IN BLOOD BY AUTOMATED COUNT: 14.6 % (ref 11.5–14.5)
GLOBULIN SER CALC-MCNC: 3.3 G/DL (ref 2–4)
GLUCOSE SERPL-MCNC: 95 MG/DL (ref 65–100)
GLUCOSE UR STRIP.AUTO-MCNC: NEGATIVE MG/DL
HCT VFR BLD AUTO: 37.6 % (ref 35–47)
HDLC SERPL-MCNC: 87 MG/DL
HDLC SERPL: 2.1 {RATIO} (ref 0–5)
HGB BLD-MCNC: 11.8 G/DL (ref 11.5–16)
HGB UR QL STRIP: ABNORMAL
HYALINE CASTS URNS QL MICRO: ABNORMAL /LPF (ref 0–5)
IMM GRANULOCYTES # BLD AUTO: 0 K/UL (ref 0–0.04)
IMM GRANULOCYTES NFR BLD AUTO: 0 % (ref 0–0.5)
KETONES UR QL STRIP.AUTO: NEGATIVE MG/DL
LDLC SERPL CALC-MCNC: 75.8 MG/DL (ref 0–100)
LEUKOCYTE ESTERASE UR QL STRIP.AUTO: NEGATIVE
LYMPHOCYTES # BLD: 1.7 K/UL (ref 0.8–3.5)
LYMPHOCYTES NFR BLD: 21 % (ref 12–49)
MCH RBC QN AUTO: 28.6 PG (ref 26–34)
MCHC RBC AUTO-ENTMCNC: 31.4 G/DL (ref 30–36.5)
MCV RBC AUTO: 91.3 FL (ref 80–99)
MONOCYTES # BLD: 0.4 K/UL (ref 0–1)
MONOCYTES NFR BLD: 5 % (ref 5–13)
NEUTS SEG # BLD: 5.9 K/UL (ref 1.8–8)
NEUTS SEG NFR BLD: 72 % (ref 32–75)
NITRITE UR QL STRIP.AUTO: NEGATIVE
NRBC # BLD: 0 K/UL (ref 0–0.01)
NRBC BLD-RTO: 0 PER 100 WBC
PH UR STRIP: 5.5 [PH] (ref 5–8)
PLATELET # BLD AUTO: 272 K/UL (ref 150–400)
PMV BLD AUTO: 11.4 FL (ref 8.9–12.9)
POTASSIUM SERPL-SCNC: 4.1 MMOL/L (ref 3.5–5.1)
PROT SERPL-MCNC: 7.6 G/DL (ref 6.4–8.2)
PROT UR STRIP-MCNC: NEGATIVE MG/DL
RBC # BLD AUTO: 4.12 M/UL (ref 3.8–5.2)
RBC #/AREA URNS HPF: ABNORMAL /HPF (ref 0–5)
SODIUM SERPL-SCNC: 140 MMOL/L (ref 136–145)
SP GR UR REFRACTOMETRY: 1.02 (ref 1–1.03)
TRIGL SERPL-MCNC: 81 MG/DL (ref ?–150)
UA: UC IF INDICATED,UAUC: ABNORMAL
UROBILINOGEN UR QL STRIP.AUTO: 0.2 EU/DL (ref 0.2–1)
VLDLC SERPL CALC-MCNC: 16.2 MG/DL
WBC # BLD AUTO: 8.1 K/UL (ref 3.6–11)
WBC URNS QL MICRO: ABNORMAL /HPF (ref 0–4)

## 2021-05-17 PROCEDURE — G0439 PPPS, SUBSEQ VISIT: HCPCS | Performed by: FAMILY MEDICINE

## 2021-05-17 PROCEDURE — G8753 SYS BP > OR = 140: HCPCS | Performed by: FAMILY MEDICINE

## 2021-05-17 PROCEDURE — G8754 DIAS BP LESS 90: HCPCS | Performed by: FAMILY MEDICINE

## 2021-05-17 PROCEDURE — G9717 DOC PT DX DEP/BP F/U NT REQ: HCPCS | Performed by: FAMILY MEDICINE

## 2021-05-17 PROCEDURE — 3017F COLORECTAL CA SCREEN DOC REV: CPT | Performed by: FAMILY MEDICINE

## 2021-05-17 PROCEDURE — G9899 SCRN MAM PERF RSLTS DOC: HCPCS | Performed by: FAMILY MEDICINE

## 2021-05-17 PROCEDURE — G8420 CALC BMI NORM PARAMETERS: HCPCS | Performed by: FAMILY MEDICINE

## 2021-05-17 PROCEDURE — G8427 DOCREV CUR MEDS BY ELIG CLIN: HCPCS | Performed by: FAMILY MEDICINE

## 2021-05-17 RX ORDER — FLUTICASONE PROPIONATE 50 MCG
SPRAY, SUSPENSION (ML) NASAL
Qty: 3 BOTTLE | Refills: 5 | Status: SHIPPED | OUTPATIENT
Start: 2021-05-17 | End: 2021-06-09 | Stop reason: SDUPTHER

## 2021-05-17 RX ORDER — CALC/MAG/B COMPLEX/D3/HERB 61
15 TABLET ORAL
Qty: 90 CAP | Refills: 5 | Status: SHIPPED | OUTPATIENT
Start: 2021-05-17 | End: 2022-05-31

## 2021-05-17 RX ORDER — ESTRADIOL 0.1 MG/G
CREAM VAGINAL
COMMUNITY
Start: 2021-05-11

## 2021-05-17 NOTE — PROGRESS NOTES
Patient stated name &   Chief Complaint   Patient presents with    New Patient     Medication refills         Health Maintenance Due   Topic    COVID-19 Vaccine (1)    Shingrix Vaccine Age 50> (1 of 2)    DTaP/Tdap/Td series (2 - Td)    Medicare Yearly Exam     PAP AKA CERVICAL CYTOLOGY        Wt Readings from Last 3 Encounters:   21 116 lb 12.8 oz (53 kg)   02/10/21 114 lb (51.7 kg)   20 113 lb (51.3 kg)     Temp Readings from Last 3 Encounters:   21 97.3 °F (36.3 °C) (Temporal)   10/26/19 99 °F (37.2 °C)   10/18/19 98.6 °F (37 °C)     BP Readings from Last 3 Encounters:   21 (!) 158/74   02/10/21 134/80   20 104/66     Pulse Readings from Last 3 Encounters:   21 69   02/10/21 80   20 (!) 59         Learning Assessment:  :     No flowsheet data found. Depression Screening:  :     No flowsheet data found. Fall Risk Assessment:  :     Fall Risk Assessment, last 12 mths 2018   Able to walk? Yes   Fall in past 12 months? No       Abuse Screening:  :     No flowsheet data found. Coordination of Care Questionnaire:  :     1) Have you been to an emergency room, urgent care clinic since your last visit? No  Hospitalized since your last visit? No             2) Have you seen or consulted any other health care providers outside of [address] [place] [address] since your last visit? No  (Include any pap smears or colon screenings in this section.)    Patient is accompanied by self I have received verbal consent from Dilan Manuel to discuss any/all medical information while they are present in the room.

## 2021-05-17 NOTE — PROGRESS NOTES
This is the Subsequent Medicare Annual Wellness Exam, performed 12 months or more after the Initial AWV or the last Subsequent AWV    I have reviewed the patient's medical history in detail and updated the computerized patient record. Assessment/Plan   Education and counseling provided:  Are appropriate based on today's review and evaluation    1. Medicare annual wellness visit, subsequent  -     THYROID CASCADE PROFILE; Future  -     URINALYSIS W/ REFLEX CULTURE; Future  -     CBC WITH AUTOMATED DIFF; Future  -     METABOLIC PANEL, COMPREHENSIVE; Future  -     LIPID PANEL; Future  2. Gastroesophageal reflux disease with esophagitis, unspecified whether hemorrhage  -     lansoprazole (PREVACID) 15 mg capsule; Take 1 Cap by mouth Daily (before breakfast). , Normal, Disp-90 Cap, R-5  3. Gastritis without bleeding, unspecified chronicity, unspecified gastritis type  -     lansoprazole (PREVACID) 15 mg capsule; Take 1 Cap by mouth Daily (before breakfast). , Normal, Disp-90 Cap, R-5  -     CBC WITH AUTOMATED DIFF; Future  4. Allergic sinusitis  -     fluticasone propionate (FLONASE) 50 mcg/actuation nasal spray; SPRAY 2 SPRAYS IN EACH NOSTRIL EVERY MORNING, Normal, Disp-3 Bottle, R-5  5. Essential hypertension  -     THYROID CASCADE PROFILE; Future  -     URINALYSIS W/ REFLEX CULTURE; Future  -     METABOLIC PANEL, COMPREHENSIVE; Future  6. Screening for cardiovascular condition  -     LIPID PANEL; Future  7. Hearing loss, unspecified hearing loss type, unspecified laterality  -     REFERRAL TO ENT-OTOLARYNGOLOGY  8. Iron deficiency anemia, unspecified iron deficiency anemia type  -     CBC WITH AUTOMATED DIFF; Future      Thyroid medication is managed by endocrinologist  Ms. Manuel has been given the following recommendations today due to her elevated BP reading: rescreen BP within a minimum of 2 weeks, lifestyle modifications to include: dietary sodium restriction and lab tests ordered.       Depression Risk Factor Screening   No flowsheet data found. Alcohol Risk Screen    Do you average more than 1 drink per night or more than 7 drinks a week:  No    On any one occasion in the past three months have you have had more than 3 drinks containing alcohol:  No        Functional Ability and Level of Safety    Hearing: The patient needs further evaluation. Activities of Daily Living: The home contains: grab bars  Patient does total self care      Ambulation: with no difficulty     Fall Risk:  Fall Risk Assessment, last 12 mths 4/6/2018   Able to walk? Yes   Fall in past 12 months? No      Abuse Screen:  Patient is not abused       Cognitive Screening    Has your family/caregiver stated any concerns about your memory: no     Cognitive Screening: Interview    Review of Systems   Constitutional: Negative. HENT: Negative. Eyes: Negative. Respiratory: Negative. Cardiovascular: Negative. Gastrointestinal: Negative. Genitourinary: Negative. Musculoskeletal: Negative. Skin: Negative. Neurological: Negative. Endo/Heme/Allergies: Negative. Psychiatric/Behavioral: Negative. Physical Exam  Constitutional:       Appearance: Normal appearance. HENT:      Right Ear: Tympanic membrane, ear canal and external ear normal.      Left Ear: Tympanic membrane, ear canal and external ear normal.   Eyes:      Extraocular Movements: Extraocular movements intact. Conjunctiva/sclera: Conjunctivae normal.      Pupils: Pupils are equal, round, and reactive to light. Neck:      Musculoskeletal: Normal range of motion and neck supple. Thyroid: No thyromegaly. Cardiovascular:      Rate and Rhythm: Normal rate and regular rhythm. Pulmonary:      Effort: Pulmonary effort is normal.      Breath sounds: Normal breath sounds. Abdominal:      General: Bowel sounds are normal. There is no distension. Palpations: Abdomen is soft. Tenderness: There is no abdominal tenderness. Musculoskeletal: Normal range of motion. Right lower leg: No edema. Left lower leg: No edema. Lymphadenopathy:      Cervical: No cervical adenopathy. Skin:     General: Skin is warm and dry. Neurological:      General: No focal deficit present. Mental Status: She is alert and oriented to person, place, and time. Mental status is at baseline.    Psychiatric:         Mood and Affect: Mood normal.         Behavior: Behavior normal.         Health Maintenance Due     Health Maintenance Due   Topic Date Due    Shingrix Vaccine Age 49> (1 of 2) Never done    PAP AKA CERVICAL CYTOLOGY  06/07/2021       Patient Care Team   Patient Care Team:  Nahun Miller MD as PCP - General    History     Patient Active Problem List   Diagnosis Code    Pain in joint of right elbow M25.521    Pain in joint of left elbow M25.522    Knee pain, left M25.562    Knee pain, right M25.561    Diarrhea R19.7    Nausea and vomiting R11.2    Knee pain, bilateral M25.561, M25.562    Osteoarthritis of knees, bilateral M17.0    Elbow pain, right M25.521    Hypertension I10    Lumbar back pain M54.5    Other microscopic hematuria R31.29    Postmenopausal atrophic vaginitis N95.2    Scotoma H53.419    Pelvic pain R10.2    Paresthesia of hand, bilateral R20.2    Family history of malignant neoplasm of gastrointestinal tract Z80.0    Pain in joint involving multiple sites M25.50    Depression F32.9    Dizziness R42    GERD (gastroesophageal reflux disease) K21.9    Gastritis K29.70    Reflux esophagitis K21.00    Chest pain, atypical R07.89    Normocytic anemia D64.9    Nonspecific elevation of levels of transaminase and lactic acid dehydrogenase (LDH) R74.01, R74.02    Sinusitis, chronic J32.9    Fibroids D21.9    Women's annual routine gynecological examination Z01.419    Postmenopausal bleeding N95.0    Screening for malignant neoplasm of cervix Z12.4    Urethral caruncle N36.2    Allergic sinusitis J30.9    Status post surgery Z98.890    Multinodular goiter E04.2    Chronic tension-type headache, intractable G44.221    Thyroid nodule E04.1    Sensation of lump in throat R22.1    Herpes B00.9     Past Medical History:   Diagnosis Date    Acute allergic rhinitis     Allergic rhinitis     Anxiety disorder     Anxiety Disorder/insomnia    Back pain     Chest pain, atypical     Chronic sinusitis     Depression     Elbow pain, right     Elevated serum gamma-glutamyl transferase level     elevated GGTP 79-no exposure to hepb/c immune to hep b    Equivocal stress echocardiogram     Stress echo feb 2015 neg, EF 55%    Fibroid, uterine     Genital herpes     GERD (gastroesophageal reflux disease)     GERD: s/p EGD 11-5-10: chronic inactive gastritis    High transaminase levels     History of colonoscopy     colonoscopy 9/09 one hypereplastic polyp recheck in 8-10 years.     HSV infection     Hydronephrosis 02/02/2016    right hydronephrosis transiently 2/2/16    Hypertension     Not on any medication    Menopause 2015    Normocytic anemia     Osteoarthritis     KNEES, ELBOWS, HANDS    Paresthesia of left upper extremity     Pelvic pain     Post-menopausal bleeding 10/2018    Thyroid disease     Tinea pedis     Visual field scotoma       Past Surgical History:   Procedure Laterality Date    HX COLONOSCOPY  09/18/2014    colonoscopy done at [facility] 9/18/14    HX ENDOSCOPY  2010    [place] 11/1/2016:    HX GI  10/2019    COLONOSCOPY    HX HYSTERECTOMY  04/17/2019    PERFORMED AT     HX HYSTEROSCOPY  11/07/2018    With [facility] - [place]    HX HYSTEROSCOPY WITH ENDOMETRIAL ABLATION  07/2012    [facility]    HX OTHER SURGICAL      laproscopy,btl    HX THYROIDECTOMY  10/25/2019    For benign multi lobular goiter     Current Outpatient Medications   Medication Sig Dispense Refill    estradioL (ESTRACE) 0.01 % (0.1 mg/gram) vaginal cream       lansoprazole (PREVACID) 15 mg capsule Take 1 Cap by mouth Daily (before breakfast). 90 Cap 5    fluticasone propionate (FLONASE) 50 mcg/actuation nasal spray SPRAY 2 SPRAYS IN EACH NOSTRIL EVERY MORNING 3 Bottle 5    ferrous sulfate (Iron) 325 mg (65 mg iron) tablet Take  by mouth Daily (before breakfast).  levothyroxine (SYNTHROID) 75 mcg tablet 1 tab daily Monday through friday      valACYclovir (VALTREX) 500 mg tablet TAKE 1 TABLET BY MOUTH TWICE DAILY FOR 3 DAYS AS NEEDED 18 Tab 1    CITALOPRAM HYDROBROMIDE (CITALOPRAM PO) Take 20 mg by mouth every morning.        Allergies   Allergen Reactions    Iodinated Contrast Media Rash     Rash and trouble breathing    Other Plant, Animal, Environmental Sneezing     Pollen, mold and dust    Doxycycline Nausea and Vomiting       Family History   Problem Relation Age of Onset    Dementia Mother         Mother Sheila Mortimer.) - Has Family History of Other Medical Problems - dementia - Entered On: 2/2/2016    Hypertension Mother         Mother Sheila Mortimer.) - Has Family History of Hypertension - Entered On: 11/18/2014    Arthritis Mother         Mother Sheila Mortimer.) - Has Family History of Arthritis - Entered On: 11/19/2014    Lupus Sister     Thyroid Disease Sister     Colon Cancer Brother         Brother (full) - Has Family History of Colon Cancer - Entered On: 3/3/2015    Cancer Maternal Grandmother         MGM - Has Family History of Other Cancer - bladder - Entered On: 10/6/2015    Arthritis Maternal Grandmother     Thyroid Disease Sister         Sister (full) - Has Family History of Thyroid Disorder - Entered On: 12/23/2014    No Known Problems Brother     No Known Problems Son     Anesth Problems Neg Hx      Social History     Tobacco Use    Smoking status: Never Smoker    Smokeless tobacco: Never Used   Substance Use Topics    Alcohol use: No         Leonardo Duane, MD

## 2021-05-17 NOTE — PATIENT INSTRUCTIONS
Medicare Wellness Visit, Female The best way to live healthy is to have a lifestyle where you eat a well-balanced diet, exercise regularly, limit alcohol use, and quit all forms of tobacco/nicotine, if applicable. Regular preventive services are another way to keep healthy. Preventive services (vaccines, screening tests, monitoring & exams) can help personalize your care plan, which helps you manage your own care. Screening tests can find health problems at the earliest stages, when they are easiest to treat. Rosa M follows the current, evidence-based guidelines published by the [facility] Dylon Tillman ([facility]) when recommending preventive services for our patients. Because we follow these guidelines, sometimes recommendations change over time as research supports it. (For example, mammograms used to be recommended annually. Even though Medicare will still pay for an annual mammogram, the newer guidelines recommend a mammogram every two years for women of average risk). Of course, you and your doctor may decide to screen more often for some diseases, based on your risk and your co-morbidities (chronic disease you are already diagnosed with). Preventive services for you include: - Medicare offers their members a free annual wellness visit, which is time for you and your primary care provider to discuss and plan for your preventive service needs. Take advantage of this benefit every year! 
-All adults over the age of 72 should receive the recommended pneumonia vaccines. Current USPSTF guidelines recommend a series of two vaccines for the best pneumonia protection.  
-All adults should have a flu vaccine yearly and a tetanus vaccine every 10 years.  
-All adults age 48 and older should receive the shingles vaccines (series of two vaccines).      
-All adults age 38-68 who are overweight should have a diabetes screening test once every three years.  
-All adults born between [birth year] and 1966 should be screened once for Hepatitis C. 
-Other screening tests and preventive services for persons with diabetes include: an eye exam to screen for diabetic retinopathy, a kidney function test, a foot exam, and stricter control over your cholesterol.  
-Cardiovascular screening for adults with routine risk involves an electrocardiogram (ECG) at intervals determined by your doctor.  
-Colorectal cancer screenings should be done for adults age 54-65 with no increased risk factors for colorectal cancer. There are a number of acceptable methods of screening for this type of cancer. Each test has its own benefits and drawbacks. Discuss with your doctor what is most appropriate for you during your annual wellness visit. The different tests include: colonoscopy (considered the best screening method), a fecal occult blood test, a fecal DNA test, and sigmoidoscopy. 
 
-A bone mass density test is recommended when a woman turns 65 to screen for osteoporosis. This test is only recommended one time, as a screening. Some providers will use this same test as a disease monitoring tool if you already have osteoporosis. -Breast cancer screenings are recommended every other year for women of normal risk, age 54-69. 
-Cervical cancer screenings for women over age 72 are only recommended with certain risk factors. Here is a list of your current Health Maintenance items (your personalized list of preventive services) with a due date: 
Health Maintenance Due Topic Date Due  Shingles Vaccine (1 of 2) Never done  Pap Test  06/07/2021 Medicare Wellness Visit, Female The best way to live healthy is to have a lifestyle where you eat a well-balanced diet, exercise regularly, limit alcohol use, and quit all forms of tobacco/nicotine, if applicable. Regular preventive services are another way to keep healthy.  Preventive services (vaccines, screening tests, monitoring & exams) can help personalize your care plan, which helps you manage your own care. Screening tests can find health problems at the earliest stages, when they are easiest to treat. Rosa M follows the current, evidence-based guidelines published by the [facility] Dylon Tillman ([facility]) when recommending preventive services for our patients. Because we follow these guidelines, sometimes recommendations change over time as research supports it. (For example, mammograms used to be recommended annually. Even though Medicare will still pay for an annual mammogram, the newer guidelines recommend a mammogram every two years for women of average risk). Of course, you and your doctor may decide to screen more often for some diseases, based on your risk and your co-morbidities (chronic disease you are already diagnosed with). Preventive services for you include: - Medicare offers their members a free annual wellness visit, which is time for you and your primary care provider to discuss and plan for your preventive service needs. Take advantage of this benefit every year! 
-All adults over the age of 72 should receive the recommended pneumonia vaccines. Current USPSTF guidelines recommend a series of two vaccines for the best pneumonia protection.  
-All adults should have a flu vaccine yearly and a tetanus vaccine every 10 years.  
-All adults age 48 and older should receive the shingles vaccines (series of two vaccines).      
-All adults age 38-68 who are overweight should have a diabetes screening test once every three years.  
-All adults born between [birth year] and 1966 should be screened once for Hepatitis C. 
-Other screening tests and preventive services for persons with diabetes include: an eye exam to screen for diabetic retinopathy, a kidney function test, a foot exam, and stricter control over your cholesterol.  
-Cardiovascular screening for adults with routine risk involves an electrocardiogram (ECG) at intervals determined by your doctor.  
-Colorectal cancer screenings should be done for adults age 54-65 with no increased risk factors for colorectal cancer. There are a number of acceptable methods of screening for this type of cancer. Each test has its own benefits and drawbacks. Discuss with your doctor what is most appropriate for you during your annual wellness visit. The different tests include: colonoscopy (considered the best screening method), a fecal occult blood test, a fecal DNA test, and sigmoidoscopy. 
 
-A bone mass density test is recommended when a woman turns 65 to screen for osteoporosis. This test is only recommended one time, as a screening. Some providers will use this same test as a disease monitoring tool if you already have osteoporosis. -Breast cancer screenings are recommended every other year for women of normal risk, age 54-69. 
-Cervical cancer screenings for women over age 72 are only recommended with certain risk factors. Here is a list of your current Health Maintenance items (your personalized list of preventive services) with a due date: 
Health Maintenance Due Topic Date Due  Shingles Vaccine (1 of 2) Never done  Pap Test  06/07/2021

## 2021-05-17 NOTE — PROGRESS NOTES
This is the Subsequent Medicare Annual Wellness Exam, performed 12 months or more after the Initial AWV or the last Subsequent AWV I have reviewed the patient's medical history in detail and updated the computerized patient record. Assessment/Plan Education and counseling provided: 
{Education POHY:61886::\"IMQ appropriate based on today's review and evaluation\"} 1. Medicare annual wellness visit, subsequent 2. Gastroesophageal reflux disease with esophagitis, unspecified whether hemorrhage 
-     lansoprazole (PREVACID) 15 mg capsule; Take 1 Cap by mouth Daily (before breakfast). , Normal, Disp-90 Cap, R-5 
3. Gastritis without bleeding, unspecified chronicity, unspecified gastritis type 
-     lansoprazole (PREVACID) 15 mg capsule; Take 1 Cap by mouth Daily (before breakfast). , Normal, Disp-90 Cap, R-5 
4. Allergic sinusitis 
-     fluticasone propionate (FLONASE) 50 mcg/actuation nasal spray; SPRAY 2 SPRAYS IN EACH NOSTRIL EVERY MORNING, Normal, Disp-3 Bottle, R-5 Depression Risk Factor Screening No flowsheet data found. Alcohol Risk Screen Do you average more than 1 drink per night or more than 7 drinks a week:  {Yes/No:458142::\"No\"} On any one occasion in the past three months have you have had more than 3 drinks containing alcohol:  {Yes/No:282236::\"No\"} Functional Ability and Level of Safety Hearing: {Desc; hearing loss:71023::\"Hearing is good. \"} Activities of Daily Living: The home contains: {AWV Home MKIWTO:27932::\"SERENA safety equipment. \"} 
{Functional ADL's:08209::\"Patient does total self care\"} Ambulation: {Patient ambulates:56654::\"with no difficulty\"} Fall Risk: 
Fall Risk Assessment, last 12 mths 4/6/2018 Able to walk? Yes Fall in past 12 months? No  
 
 Abuse Screen: 
{Abuse Screen:19752::\"Patient is not abused\"} Cognitive Screening Has your family/caregiver stated any concerns about your memory: {AWV no/yes:57224::\"no\"} {Cognitive Screenin} Health Maintenance Due Health Maintenance Due Topic Date Due  Shingrix Vaccine Age 50> (1 of 2) Never done  PAP AKA CERVICAL CYTOLOGY  2021 Patient Care Team  
Patient Care Team: 
Renard Lala MD as PCP - General 
 
History Patient Active Problem List  
Diagnosis Code  Pain in joint of right elbow M25.521  
 Pain in joint of left elbow M25.522  Knee pain, left M25.562  Knee pain, right M25.561  Diarrhea R19.7  Nausea and vomiting R11.2  Knee pain, bilateral M25.561, M25.562  
 Osteoarthritis of knees, bilateral M17.0  Elbow pain, right M25.521  
 Hypertension I10  Lumbar back pain M54.5  Other microscopic hematuria R31.29  
 Postmenopausal atrophic vaginitis N95.2  Scotoma H53.419  
 Pelvic pain R10.2  Paresthesia of hand, bilateral R20.2  Family history of malignant neoplasm of gastrointestinal tract Z80.0  Pain in joint involving multiple sites M25.50  Depression F32.9  Dizziness R42  GERD (gastroesophageal reflux disease) K21.9  Gastritis K29.70  Reflux esophagitis K21.00  Chest pain, atypical R07.89  
 Normocytic anemia D64.9  Nonspecific elevation of levels of transaminase and lactic acid dehydrogenase (LDH) R74.01, R74.02  
 Sinusitis, chronic J32.9  Fibroids D21.9  Women's annual routine gynecological examination Z01.419  
 Postmenopausal bleeding N95.0  Screening for malignant neoplasm of cervix Z12.4  Urethral caruncle N36.2  Allergic sinusitis J30.9  Status post surgery Z98.890  Multinodular goiter E04.2  Chronic tension-type headache, intractable G44.221  Thyroid nodule E04.1  Sensation of lump in throat R22.1  Herpes B00.9 Past Medical History:  
Diagnosis Date  Acute allergic rhinitis  Allergic rhinitis  Anxiety disorder Anxiety Disorder/insomnia  Back pain  Chest pain, atypical   
 Chronic sinusitis  Depression  Elbow pain, right  Elevated serum gamma-glutamyl transferase level   
 elevated GGTP 79-no exposure to hepb/c immune to hep b  
 Equivocal stress echocardiogram   
 Stress echo feb 2015 neg, EF 55%  Fibroid, uterine  Genital herpes  GERD (gastroesophageal reflux disease) GERD: s/p EGD 11-5-10: chronic inactive gastritis  High transaminase levels  History of colonoscopy   
 colonoscopy 9/09 one hypereplastic polyp recheck in 8-10 years.  HSV infection  Hydronephrosis 02/02/2016  
 right hydronephrosis transiently 2/2/16  Hypertension Not on any medication  Menopause 2015  Normocytic anemia  Osteoarthritis KNEES, ELBOWS, HANDS  Paresthesia of left upper extremity  Pelvic pain  Post-menopausal bleeding 10/2018  Thyroid disease  Tinea pedis  Visual field scotoma Past Surgical History:  
Procedure Laterality Date  HX COLONOSCOPY  09/18/2014  
 colonoscopy done at [facility] 9/18/14  HX ENDOSCOPY  2010 [place] 11/1/2016[place] [facility] GI  10/2019 COLONOSCOPY  
 HX HYSTERECTOMY  04/17/2019 PERFORMED AT [facility] HX HYSTEROSCOPY  11/07/2018 With D&C - Andreas Maldonado  HX HYSTEROSCOPY WITH ENDOMETRIAL ABLATION  07/2012 HTA - Artelia Seat  HX OTHER SURGICAL    
 laproscopy,btl  HX THYROIDECTOMY  10/25/2019 For benign multi lobular goiter Current Outpatient Medications Medication Sig Dispense Refill  estradioL (ESTRACE) 0.01 % (0.1 mg/gram) vaginal cream     
 lansoprazole (PREVACID) 15 mg capsule Take 1 Cap by mouth Daily (before breakfast). 90 Cap 5  
 fluticasone propionate (FLONASE) 50 mcg/actuation nasal spray SPRAY 2 SPRAYS IN EACH NOSTRIL EVERY MORNING 3 Bottle 5  
 ferrous sulfate (Iron) 325 mg (65 mg iron) tablet Take  by mouth Daily (before breakfast).  levothyroxine (SYNTHROID) 75 mcg tablet 1 tab daily Monday through friday  valACYclovir (VALTREX) 500 mg tablet TAKE 1 TABLET BY MOUTH TWICE DAILY FOR 3 DAYS AS NEEDED 18 Tab 1  
 CITALOPRAM HYDROBROMIDE (CITALOPRAM PO) Take 20 mg by mouth every morning. Allergies Allergen Reactions  Iodinated Contrast Media Rash Rash and trouble breathing  Other Plant, Animal, Environmental Sneezing Pollen, mold and dust  
 Doxycycline Nausea and Vomiting Family History Problem Relation Age of Onset  Dementia Mother Mother Daya Pair.) - Has Family History of Other Medical Problems - dementia - Entered On: 2/2/2016  Hypertension Mother Mother Daya Pair.) - Has Family History of Hypertension - Entered On: 11/18/2014  Arthritis Mother Mother Daya Pair.) - Has Family History of Arthritis - Entered On: 11/19/2014  Lupus Sister  Thyroid Disease Sister  Colon Cancer Brother Brother (full) - Has Family History of Colon Cancer - Entered On: 3/3/2015  Cancer Maternal Grandmother MGM - Has Family History of Other Cancer - bladder - Entered On: 10/6/2015  Arthritis Maternal Grandmother  Thyroid Disease Sister Sister (full) - Has Family History of Thyroid Disorder - Entered On: 12/23/2014  No Known Problems Brother  No Known Problems Son  Anesth Problems Neg Hx Social History Tobacco Use  Smoking status: Never Smoker  Smokeless tobacco: Never Used Substance Use Topics  Alcohol use: No  
 
 
 
Aida Burt MD  
This is the Subsequent Medicare Annual Wellness Exam, performed 12 months or more after the Initial AWV or the last Subsequent AWV I have reviewed the patient's medical history in detail and updated the computerized patient record. Assessment/Plan Education and counseling provided: 
{Education List, choose as appropriate:19754::\"Are appropriate based on today's review and evaluation\"} 1. Medicare annual wellness visit, subsequent 2.  Gastroesophageal reflux disease with esophagitis, unspecified whether hemorrhage 
-     lansoprazole (PREVACID) 15 mg capsule; Take 1 Cap by mouth Daily (before breakfast). , Normal, Disp-90 Cap, R-5 
3. Gastritis without bleeding, unspecified chronicity, unspecified gastritis type 
-     lansoprazole (PREVACID) 15 mg capsule; Take 1 Cap by mouth Daily (before breakfast). , Normal, Disp-90 Cap, R-5 
4. Allergic sinusitis 
-     fluticasone propionate (FLONASE) 50 mcg/actuation nasal spray; SPRAY 2 SPRAYS IN EACH NOSTRIL EVERY MORNING, Normal, Disp-3 Bottle, R-5 Depression Risk Factor Screening: No flowsheet data found. Alcohol Risk Screen Do you average more than 1 drink per night or more than 7 drinks a week:  {Yes/No:738027::\"No\"} On any one occasion in the past three months have you have had more than 3 drinks containing alcohol:  {Yes/No:022402::\"No\"} Functional Ability and Level of Safety:  
 Hearing: {Desc; hearing loss:80848::\"Hearing is good. \"} Activities of Daily Living: The home contains: {AWV Home ZNUPLJ:60766::\"NU safety equipment. \"} 
{Functional ADL's:34379::\"Patient does total self care\"} Ambulation: {Patient ambulates:18561::\"with no difficulty\"} Fall Risk: 
Fall Risk Assessment, last 12 mths 2018 Able to walk? Yes Fall in past 12 months? No  
 
 Abuse Screen: 
{Abuse Screen:::\"Patient is not abused\"} Cognitive Screening Has your family/caregiver stated any concerns about your memory: {AWV no/yes:36190::\"no\"} {Cognitive Screenin} Health Maintenance Due Health Maintenance Due Topic Date Due  Shingrix Vaccine Age 50> (1 of 2) Never done  PAP AKA CERVICAL CYTOLOGY  2021 Patient Care Team  
Patient Care Team: 
Leonid Callahan MD as PCP - General 
 
History Patient Active Problem List  
Diagnosis Code  Pain in joint of right elbow M25.521  
 Pain in joint of left elbow M25.522  Knee pain, left M25.562  Knee pain, right M25.561  Diarrhea R19.7  Nausea and vomiting R11.2  Knee pain, bilateral M25.561, M25.562  
 Osteoarthritis of knees, bilateral M17.0  Elbow pain, right M25.521  
 Hypertension I10  Lumbar back pain M54.5  Other microscopic hematuria R31.29  
 Postmenopausal atrophic vaginitis N95.2  Scotoma H53.419  
 Pelvic pain R10.2  Paresthesia of hand, bilateral R20.2  Family history of malignant neoplasm of gastrointestinal tract Z80.0  Pain in joint involving multiple sites M25.50  Depression F32.9  Dizziness R42  GERD (gastroesophageal reflux disease) K21.9  Gastritis K29.70  Reflux esophagitis K21.00  Chest pain, atypical R07.89  
 Normocytic anemia D64.9  Nonspecific elevation of levels of transaminase and lactic acid dehydrogenase (LDH) R74.01, R74.02  
 Sinusitis, chronic J32.9  Fibroids D21.9  Women's annual routine gynecological examination Z01.419  
 Postmenopausal bleeding N95.0  Screening for malignant neoplasm of cervix Z12.4  Urethral caruncle N36.2  Allergic sinusitis J30.9  Status post surgery Z98.890  Multinodular goiter E04.2  Chronic tension-type headache, intractable G44.221  Thyroid nodule E04.1  Sensation of lump in throat R22.1  Herpes B00.9 Past Medical History:  
Diagnosis Date  Acute allergic rhinitis  Allergic rhinitis  Anxiety disorder Anxiety Disorder/insomnia  Back pain  Chest pain, atypical   
 Chronic sinusitis  Depression  Elbow pain, right  Elevated serum gamma-glutamyl transferase level   
 elevated GGTP 79-no exposure to hepb/c immune to hep b  
 Equivocal stress echocardiogram   
 Stress echo feb 2015 neg, EF 55%  Fibroid, uterine  Genital herpes  GERD (gastroesophageal reflux disease) GERD: s/p EGD 11-5-10: chronic inactive gastritis  High transaminase levels  History of colonoscopy   
 colonoscopy 9/09 one hypereplastic polyp recheck in 8-10 years.  HSV infection  Hydronephrosis 02/02/2016  
 right hydronephrosis transiently 2/2/16  Hypertension Not on any medication  Menopause 2015  Normocytic anemia  Osteoarthritis KNEES, ELBOWS, HANDS  Paresthesia of left upper extremity  Pelvic pain  Post-menopausal bleeding 10/2018  Thyroid disease  Tinea pedis  Visual field scotoma Past Surgical History:  
Procedure Laterality Date  HX COLONOSCOPY  09/18/2014  
 colonoscopy done at [facility] 9/18/14  HX ENDOSCOPY  2010 [place] 11/1/2016[place] HX GI  10/2019 COLONOSCOPY  
 HX HYSTERECTOMY  04/17/2019 PERFORMED AT [facility] HX HYSTEROSCOPY  11/07/2018 With D&C - Seretha Mood  HX HYSTEROSCOPY WITH ENDOMETRIAL ABLATION  07/2012 HTA - Kevin Push  HX OTHER SURGICAL    
 laproscopy,btl  HX THYROIDECTOMY  10/25/2019 For benign multi lobular goiter Current Outpatient Medications Medication Sig Dispense Refill  estradioL (ESTRACE) 0.01 % (0.1 mg/gram) vaginal cream     
 lansoprazole (PREVACID) 15 mg capsule Take 1 Cap by mouth Daily (before breakfast). 90 Cap 5  
 fluticasone propionate (FLONASE) 50 mcg/actuation nasal spray SPRAY 2 SPRAYS IN EACH NOSTRIL EVERY MORNING 3 Bottle 5  
 ferrous sulfate (Iron) 325 mg (65 mg iron) tablet Take  by mouth Daily (before breakfast).  levothyroxine (SYNTHROID) 75 mcg tablet 1 tab daily Monday through friday  valACYclovir (VALTREX) 500 mg tablet TAKE 1 TABLET BY MOUTH TWICE DAILY FOR 3 DAYS AS NEEDED 18 Tab 1  
 CITALOPRAM HYDROBROMIDE (CITALOPRAM PO) Take 20 mg by mouth every morning. Allergies Allergen Reactions  Iodinated Contrast Media Rash Rash and trouble breathing  Other Plant, Animal, Environmental Sneezing Pollen, mold and dust  
 Doxycycline Nausea and Vomiting Family History Problem Relation Age of Onset  Dementia Mother Mother Niharika Cornelius.) - Has Family History of Other Medical Problems - dementia - Entered On: 2/2/2016  Hypertension Mother Mother Letty Jaramillo.) - Has Family History of Hypertension - Entered On: 11/18/2014  Arthritis Mother Mother Letty Jaramillo.) - Has Family History of Arthritis - Entered On: 11/19/2014  Lupus Sister  Thyroid Disease Sister  Colon Cancer Brother Brother (full) - Has Family History of Colon Cancer - Entered On: 3/3/2015  Cancer Maternal Grandmother MGM - Has Family History of Other Cancer - bladder - Entered On: 10/6/2015  Arthritis Maternal Grandmother  Thyroid Disease Sister Sister (full) - Has Family History of Thyroid Disorder - Entered On: 12/23/2014  No Known Problems Brother  No Known Problems Son  Anesth Problems Neg Hx Social History Tobacco Use  Smoking status: Never Smoker  Smokeless tobacco: Never Used Substance Use Topics  Alcohol use: No  
 
 
Manihsa Manuel, who was evaluated through a synchronous (real-time) {virtual platform audio-video vs audio only:79162::\"audio-video\"} encounter, and/or her healthcare decision maker, is aware that it is a billable service, with coverage as determined by her insurance carrier. She provided verbal consent to proceed: {YES/NO/NA-Consent obtained within past 12 months:54179::\"Yes\"}, and patient identification was verified. It was conducted pursuant to the emergency declaration under the [address] [place] [address] and the [employer] and [employer] Act. A caregiver was present when appropriate. Ability to conduct physical exam was limited. I was {location home office other:62090::\"at home\"}. The patient was {location home office other:61342::\"at home\"}.  
 
Rocio Jorge MD

## 2021-05-18 LAB
INTERPRETIVE COMMENT, 330017: NORMAL
T4 FREE SERPL-MCNC: 0.93 NG/DL (ref 0.82–1.77)
THYROID PEROXIDASE (TPO) AB, 006677: <9 IU/ML (ref 0–34)
TSH SERPL-ACNC: 4.69 UIU/ML (ref 0.45–4.5)

## 2021-06-09 ENCOUNTER — OFFICE VISIT (OUTPATIENT)
Dept: FAMILY MEDICINE CLINIC | Age: 61
End: 2021-06-09
Payer: MEDICARE

## 2021-06-09 VITALS
BODY MASS INDEX: 21.37 KG/M2 | HEART RATE: 68 BPM | WEIGHT: 113.2 LBS | DIASTOLIC BLOOD PRESSURE: 74 MMHG | TEMPERATURE: 97.6 F | SYSTOLIC BLOOD PRESSURE: 137 MMHG | HEIGHT: 61 IN | RESPIRATION RATE: 16 BRPM | OXYGEN SATURATION: 100 %

## 2021-06-09 DIAGNOSIS — R31.21 ASYMPTOMATIC MICROSCOPIC HEMATURIA: ICD-10-CM

## 2021-06-09 DIAGNOSIS — M17.0 OSTEOARTHRITIS OF BOTH KNEES, UNSPECIFIED OSTEOARTHRITIS TYPE: ICD-10-CM

## 2021-06-09 DIAGNOSIS — J30.9 ALLERGIC SINUSITIS: ICD-10-CM

## 2021-06-09 DIAGNOSIS — I10 ESSENTIAL HYPERTENSION: Primary | ICD-10-CM

## 2021-06-09 PROCEDURE — 99214 OFFICE O/P EST MOD 30 MIN: CPT | Performed by: FAMILY MEDICINE

## 2021-06-09 PROCEDURE — G8427 DOCREV CUR MEDS BY ELIG CLIN: HCPCS | Performed by: FAMILY MEDICINE

## 2021-06-09 PROCEDURE — G9717 DOC PT DX DEP/BP F/U NT REQ: HCPCS | Performed by: FAMILY MEDICINE

## 2021-06-09 PROCEDURE — G8420 CALC BMI NORM PARAMETERS: HCPCS | Performed by: FAMILY MEDICINE

## 2021-06-09 PROCEDURE — 3017F COLORECTAL CA SCREEN DOC REV: CPT | Performed by: FAMILY MEDICINE

## 2021-06-09 PROCEDURE — G8754 DIAS BP LESS 90: HCPCS | Performed by: FAMILY MEDICINE

## 2021-06-09 PROCEDURE — G8752 SYS BP LESS 140: HCPCS | Performed by: FAMILY MEDICINE

## 2021-06-09 PROCEDURE — G9899 SCRN MAM PERF RSLTS DOC: HCPCS | Performed by: FAMILY MEDICINE

## 2021-06-09 RX ORDER — LISINOPRIL 5 MG/1
5 TABLET ORAL DAILY
Qty: 90 TABLET | Refills: 1 | Status: SHIPPED | OUTPATIENT
Start: 2021-06-09 | End: 2021-11-04

## 2021-06-09 RX ORDER — ACETAMINOPHEN 500 MG
TABLET ORAL DAILY
COMMUNITY

## 2021-06-09 RX ORDER — FLUTICASONE PROPIONATE 50 MCG
SPRAY, SUSPENSION (ML) NASAL
Qty: 3 BOTTLE | Refills: 5 | Status: SHIPPED | OUTPATIENT
Start: 2021-06-09 | End: 2022-08-03

## 2021-06-09 NOTE — PROGRESS NOTES
Syd Nguyen is a 57 y.o. female , established patient, here for evaluation of the following chief complaint(s):    HPI  Chief Complaint   Patient presents with    Hypertension     Follow up       1. Allergic sinusitis  Patient is currently using Flonase. She is requesting refills. 2. Essential hypertension  The patient presents today for HTN follow-up. Not on any blood pressure medication yet. I will start lisinopril today. Taking medications daily w/o complications. Home BP readings range from 130s/90s. SIde effects of meds: None  Patient trying to follow low salt diet. Lab Results   Component Value Date/Time    Sodium 140 05/17/2021 11:31 AM    Potassium 4.1 05/17/2021 11:31 AM    Chloride 106 05/17/2021 11:31 AM    CO2 27 05/17/2021 11:31 AM    Anion gap 7 05/17/2021 11:31 AM    Glucose 95 05/17/2021 11:31 AM    BUN 13 05/17/2021 11:31 AM    Creatinine 0.82 05/17/2021 11:31 AM    BUN/Creatinine ratio 16 05/17/2021 11:31 AM    GFR est AA >60 05/17/2021 11:31 AM    GFR est non-AA >60 05/17/2021 11:31 AM    Calcium 9.5 05/17/2021 11:31 AM     No results found for: MCACR, MCA1, MCA2, MCA3, MCAU, MCAU2, MCALPOCT    3. Osteoarthritis of both knees, unspecified osteoarthritis type  Patient has chronic pain of both knees. She has had x-rays done with her previous orthopedic doctor. She has been diagnosed with osteoarthritis. Never had any joint injections. Requests referral to orthopedic. 4. Asymptomatic microscopic hematuria  [facility]. On routine urinalysis she was informed she has trace blood in urine. She is requesting referral to urology for further evaluation. She reports previous history of hematuria. Review of Systems   Constitutional: Negative. HENT: Negative. Eyes: Negative. Respiratory: Negative. Cardiovascular: Negative. Gastrointestinal: Negative. Genitourinary: Negative. Musculoskeletal: Negative. Skin: Negative. Neurological: Negative. Endo/Heme/Allergies: Negative. Psychiatric/Behavioral: Negative. Physical Exam  Vitals and nursing note reviewed. HENT:      Head: Normocephalic and atraumatic. Right Ear: External ear normal.      Left Ear: External ear normal.      Nose: Nose normal.   Eyes:      Conjunctiva/sclera: Conjunctivae normal.   Cardiovascular:      Rate and Rhythm: Normal rate and regular rhythm. Pulmonary:      Effort: Pulmonary effort is normal.      Breath sounds: Normal breath sounds. Abdominal:      General: Bowel sounds are normal. There is no distension. Palpations: Abdomen is soft. Tenderness: There is no abdominal tenderness. Musculoskeletal:         General: Normal range of motion. Cervical back: Normal range of motion and neck supple. Right lower leg: No edema. Left lower leg: No edema. Skin:     General: Skin is warm and dry. Neurological:      Mental Status: She is alert. /74   Pulse 68   Temp 97.6 °F (36.4 °C) (Temporal)   Resp 16   Ht 5' 1\" (1.549 m)   Wt 113 lb 3.2 oz (51.3 kg)   LMP 12/07/2014 (Approximate) Comment: Menopause at age 53  SpO2 100%   BMI 21.39 kg/m²     Allergies   Allergen Reactions    Iodinated Contrast Media Rash     Rash and trouble breathing    Other Plant, Animal, Environmental Sneezing     Pollen, mold and dust    Doxycycline Nausea and Vomiting       Current Outpatient Medications   Medication Sig    cholecalciferol (VITAMIN D3) (2,000 UNITS /50 MCG) cap capsule Take  by mouth daily.  lisinopriL (PRINIVIL, ZESTRIL) 5 mg tablet Take 1 Tablet by mouth daily.  fluticasone propionate (FLONASE) 50 mcg/actuation nasal spray SPRAY 2 SPRAYS IN EACH NOSTRIL EVERY MORNING    estradioL (ESTRACE) 0.01 % (0.1 mg/gram) vaginal cream     lansoprazole (PREVACID) 15 mg capsule Take 1 Cap by mouth Daily (before breakfast).     ferrous sulfate (Iron) 325 mg (65 mg iron) tablet Take  by mouth Daily (before breakfast).  levothyroxine (SYNTHROID) 75 mcg tablet 1 tab daily Monday through friday    valACYclovir (VALTREX) 500 mg tablet TAKE 1 TABLET BY MOUTH TWICE DAILY FOR 3 DAYS AS NEEDED    CITALOPRAM HYDROBROMIDE (CITALOPRAM PO) Take 20 mg by mouth every morning. No current facility-administered medications for this visit. Past Medical History:   Diagnosis Date    Acute allergic rhinitis     Allergic rhinitis     Anxiety disorder     Anxiety Disorder/insomnia    Back pain     Chest pain, atypical     Chronic sinusitis     Depression     Elbow pain, right     Elevated serum gamma-glutamyl transferase level     elevated GGTP 79-no exposure to hepb/c immune to hep b    Equivocal stress echocardiogram     Stress echo feb 2015 neg, EF 55%    Fibroid, uterine     Genital herpes     GERD (gastroesophageal reflux disease)     GERD: s/p EGD 11-5-10: chronic inactive gastritis    High transaminase levels     History of colonoscopy     colonoscopy 9/09 one hypereplastic polyp recheck in 8-10 years.     HSV infection     Hydronephrosis 02/02/2016    right hydronephrosis transiently 2/2/16    Hypertension     Not on any medication    Menopause 2015    Normocytic anemia     Osteoarthritis     KNEES, ELBOWS, HANDS    Paresthesia of left upper extremity     Pelvic pain     Post-menopausal bleeding 10/2018    Thyroid disease     Tinea pedis     Visual field scotoma        Past Surgical History:   Procedure Laterality Date    HX COLONOSCOPY  09/18/2014    [facility] at [facility] 9/18/14    HX ENDOSCOPY  2010    Madeleine 11/1/2016:    HX GI  10/2019    COLONOSCOPY    HX HYSTERECTOMY  04/17/2019    PERFORMED AT     HX HYSTEROSCOPY  11/07/2018    With D&C - Kwan Officer    HX HYSTEROSCOPY WITH ENDOMETRIAL ABLATION  07/2012    HTA - Faraci    HX OTHER SURGICAL      laproscopy,btl    HX THYROIDECTOMY  10/25/2019    For benign multi lobular goiter       Problem List  Date Reviewed: 2/10/2021 Codes Class Noted    Herpes ICD-10-CM: B00.9  ICD-9-CM: 054.9  12/9/2020        Thyroid nodule ICD-10-CM: E04.1  ICD-9-CM: 241.0  2/4/2020    Overview Signed 2/4/2020  5:23 PM by Camilla Litten, MD     She will need a biopsy and I recommend that she get that through an endocrinologist.  Apparently she will be down in [place] for quite some time so we will try to get her hooked up with the endocrinologist who takes care of her aunt. Sensation of lump in throat ICD-10-CM: R22.1  ICD-9-CM: 784.99  2/4/2020    Overview Signed 2/4/2020  5:23 PM by Camilla Litten, MD     I suspect this is from some occult reflux but we will get a swallow study to rule out lesions lower than my exam could appreciate. We will call her with the results. The patient understood and agreed.              Chronic tension-type headache, intractable ICD-10-CM: D08.037  ICD-9-CM: 339.12  1/14/2020        Status post surgery ICD-10-CM: Z98.890  ICD-9-CM: V45.89  10/25/2019        Multinodular goiter ICD-10-CM: E04.2  ICD-9-CM: 241.1  10/25/2019    Overview Signed 12/9/2020  8:14 PM by SHEILA Robert     Follows with Dr. Karen Farmer through [facility]              Allergic sinusitis ICD-10-CM: J30.9  ICD-9-CM: 477.9  7/18/2018        Women's annual routine gynecological examination ICD-10-CM: J52.095  ICD-9-CM: V72.31  6/7/2018        Postmenopausal bleeding ICD-10-CM: N95.0  ICD-9-CM: 627.1  6/7/2018        Screening for malignant neoplasm of cervix ICD-10-CM: Z12.4  ICD-9-CM: V76.2  6/7/2018        Urethral caruncle ICD-10-CM: N36.2  ICD-9-CM: 599.3  6/7/2018        Pain in joint of right elbow ICD-10-CM: M25.521  ICD-9-CM: 719.42  4/17/2018        Pain in joint of left elbow ICD-10-CM: M25.522  ICD-9-CM: 719.42  4/17/2018        Knee pain, left ICD-10-CM: M25.562  ICD-9-CM: 719.46  4/17/2018        Knee pain, right ICD-10-CM: M25.561  ICD-9-CM: 719.46  4/17/2018        Diarrhea ICD-10-CM: R19.7  ICD-9-CM: 787.91  4/17/2018    Overview Signed 4/17/2018 10:05 AM by Elena Thomason     Diarrhea, recurrent - reasonably short lived, no blod             Nausea and vomiting ICD-10-CM: R11.2  ICD-9-CM: 787.01  4/17/2018        Knee pain, bilateral ICD-10-CM: M25.561, M25.562  ICD-9-CM: 719.46  4/17/2018        Osteoarthritis of knees, bilateral ICD-10-CM: M17.0  ICD-9-CM: 715.96  4/17/2018        Elbow pain, right ICD-10-CM: M25.521  ICD-9-CM: 719.42  4/17/2018        Hypertension ICD-10-CM: I10  ICD-9-CM: 401.9  4/17/2018        Lumbar back pain ICD-10-CM: M54.5  ICD-9-CM: 724.2  4/17/2018        Other microscopic hematuria ICD-10-CM: R31.29  ICD-9-CM: 599.72  4/17/2018        Postmenopausal atrophic vaginitis ICD-10-CM: N95.2  ICD-9-CM: 627.3  4/17/2018        Scotoma ICD-10-CM: H53.419  ICD-9-CM: 368.44  4/17/2018    Overview Signed 4/17/2018 10:20 AM by Elena Thomason     Visual scotomata             Pelvic pain ICD-10-CM: R10.2  ICD-9-CM: [id number]  4/17/2018        Paresthesia of hand, bilateral ICD-10-CM: R20.2  ICD-9-CM: 782.0  4/17/2018    Overview Signed 4/17/2018 10:22 AM by Elena Thomason     Paresthesia, hands, left into left upper inner arm             Family history of malignant neoplasm of gastrointestinal tract ICD-10-CM: Z80.0  ICD-9-CM: V16.0  4/17/2018    Overview Signed 4/17/2018 10:25 AM by Elena Thomason     Brother in his 46s             Pain in joint involving multiple sites ICD-10-CM: M25.50  ICD-9-CM: 719.49  4/17/2018    Overview Signed 4/17/2018 10:26 AM by Luzamria Guthrie, PIP joints of the fingers, ankles,  knees             Depression ICD-10-CM: F32.9  ICD-9-CM: 700  4/17/2018    Overview Signed 12/9/2020  8:13 PM by Ramona Pehlan &   [facility] (therapist)              Dizziness ICD-10-CM: P19  ICD-9-CM: 780.4  4/17/2018        GERD (gastroesophageal reflux disease) ICD-10-CM: K21.9  ICD-9-CM: 530.81 4/17/2018    Overview Signed 12/9/2020  8:15 PM by SHEILA Barahona     Follows with GI through [facility] [id number], NP              Gastritis ICD-10-CM: K29.70  ICD-9-CM: 535.50  4/17/2018        Reflux esophagitis ICD-10-CM: K21.00  ICD-9-CM: 530.11  4/17/2018        Chest pain, atypical ICD-10-CM: R07.89  ICD-9-CM: 786.59  4/17/2018        Normocytic anemia ICD-10-CM: D64.9  ICD-9-CM: 285.9  4/17/2018        Nonspecific elevation of levels of transaminase and lactic acid dehydrogenase (LDH) ICD-10-CM: R74.01, R74.02  ICD-9-CM: 790.4  4/17/2018        Sinusitis, chronic ICD-10-CM: J32.9  ICD-9-CM: 473.9  4/17/2018        Fibroids ICD-10-CM: D21.9  ICD-9-CM: 215.9  4/17/2018    Overview Signed 4/17/2018 10:31 AM by Harley Mcconnellids, uterus                    No results found for this visit on 06/09/21. 1. Allergic sinusitis    - fluticasone propionate (FLONASE) 50 mcg/actuation nasal spray; SPRAY 2 SPRAYS IN EACH NOSTRIL EVERY MORNING  Dispense: 3 Bottle; Refill: 5    2. Essential hypertension  Ms. Manuel has been given the following recommendations today due to her elevated BP reading: rescreen BP within a minimum of 2 weeks, lifestyle modifications to include: dietary sodium restriction and anti-hypertensive pharmacologic therapy ordered. - lisinopriL (PRINIVIL, ZESTRIL) 5 mg tablet; Take 1 Tablet by mouth daily. Dispense: 90 Tablet; Refill: 1    3. Osteoarthritis of both knees, unspecified osteoarthritis type    - REFERRAL TO ORTHOPEDICS    4. Asymptomatic microscopic hematuria    - REFERRAL TO UROLOGY        Follow-up and Dispositions    · Return in about 6 months (around 12/9/2021) for follow up. I ADVISED PATIENT TO GO TO ER IF SYMPTOMS WORSEN , CHANGE OR FAILS TO IMPROVE. I have discussed the diagnosis with the patient and the intended plan as seen in the above orders.   The patient has received an after-visit summary and questions were answered concerning future plans. I have discussed medication side effects and warnings with the patient as well. The patient agrees and understands above plan.            Garima Mo MD

## 2021-06-09 NOTE — PROGRESS NOTES
Patient stated name &   Chief Complaint   Patient presents with    Hypertension     Follow up        Health Maintenance Due   Topic    Shingrix Vaccine Age 50> (1 of 2)    PAP AKA CERVICAL CYTOLOGY        Wt Readings from Last 3 Encounters:   21 113 lb 3.2 oz (51.3 kg)   21 116 lb 12.8 oz (53 kg)   02/10/21 114 lb (51.7 kg)     Temp Readings from Last 3 Encounters:   21 97.6 °F (36.4 °C) (Temporal)   21 97.3 °F (36.3 °C) (Temporal)   10/26/19 99 °F (37.2 °C)     BP Readings from Last 3 Encounters:   21 (!) 149/79   21 (!) 160/77   02/10/21 134/80     Pulse Readings from Last 3 Encounters:   21 68   21 69   02/10/21 80         Learning Assessment:  :     No flowsheet data found. Depression Screening:  :     3 most recent PHQ Screens 2021   Little interest or pleasure in doing things Not at all   Feeling down, depressed, irritable, or hopeless Not at all   Total Score PHQ 2 0       Fall Risk Assessment:  :     Fall Risk Assessment, last 12 mths 2021   Able to walk? Yes   Fall in past 12 months? 0   Do you feel unsteady? 0   Are you worried about falling 0       Abuse Screening:  :     Abuse Screening Questionnaire 2021   Do you ever feel afraid of your partner? N   Are you in a relationship with someone who physically or mentally threatens you? N   Is it safe for you to go home? Y       Coordination of Care Questionnaire:  :     1) Have you been to an emergency room, urgent care clinic since your last visit? No  Hospitalized since your last visit? No             2) Have you seen or consulted any other health care providers outside of [address] [place] [address] since your last visit? No  (Include any pap smears or colon screenings in this section.)    Patient is accompanied by self I have [facility] to discuss any/all medical information while they are present in the room.

## 2021-11-01 ENCOUNTER — TELEPHONE (OUTPATIENT)
Dept: FAMILY MEDICINE CLINIC | Age: 61
End: 2021-11-01

## 2021-11-01 ENCOUNTER — OFFICE VISIT (OUTPATIENT)
Dept: FAMILY MEDICINE CLINIC | Age: 61
End: 2021-11-01
Payer: MEDICARE

## 2021-11-01 VITALS
OXYGEN SATURATION: 100 % | BODY MASS INDEX: 20.99 KG/M2 | SYSTOLIC BLOOD PRESSURE: 134 MMHG | RESPIRATION RATE: 16 BRPM | DIASTOLIC BLOOD PRESSURE: 79 MMHG | TEMPERATURE: 98.2 F | HEIGHT: 61 IN | WEIGHT: 111.2 LBS | HEART RATE: 75 BPM

## 2021-11-01 DIAGNOSIS — E03.9 ACQUIRED HYPOTHYROIDISM: ICD-10-CM

## 2021-11-01 DIAGNOSIS — I10 ESSENTIAL HYPERTENSION: ICD-10-CM

## 2021-11-01 DIAGNOSIS — Z01.818 PREOP GENERAL PHYSICAL EXAM: Primary | ICD-10-CM

## 2021-11-01 PROCEDURE — G8754 DIAS BP LESS 90: HCPCS | Performed by: FAMILY MEDICINE

## 2021-11-01 PROCEDURE — 99213 OFFICE O/P EST LOW 20 MIN: CPT | Performed by: FAMILY MEDICINE

## 2021-11-01 PROCEDURE — G9899 SCRN MAM PERF RSLTS DOC: HCPCS | Performed by: FAMILY MEDICINE

## 2021-11-01 PROCEDURE — G8427 DOCREV CUR MEDS BY ELIG CLIN: HCPCS | Performed by: FAMILY MEDICINE

## 2021-11-01 PROCEDURE — G8420 CALC BMI NORM PARAMETERS: HCPCS | Performed by: FAMILY MEDICINE

## 2021-11-01 PROCEDURE — G8752 SYS BP LESS 140: HCPCS | Performed by: FAMILY MEDICINE

## 2021-11-01 PROCEDURE — G9717 DOC PT DX DEP/BP F/U NT REQ: HCPCS | Performed by: FAMILY MEDICINE

## 2021-11-01 PROCEDURE — 3017F COLORECTAL CA SCREEN DOC REV: CPT | Performed by: FAMILY MEDICINE

## 2021-11-01 RX ORDER — PREDNISOLONE ACETATE 10 MG/ML
SUSPENSION/ DROPS OPHTHALMIC
COMMUNITY
Start: 2021-10-31

## 2021-11-01 RX ORDER — KETOROLAC TROMETHAMINE 5 MG/ML
SOLUTION OPHTHALMIC
COMMUNITY
Start: 2021-10-31

## 2021-11-01 RX ORDER — OFLOXACIN 3 MG/ML
SOLUTION/ DROPS OPHTHALMIC
COMMUNITY
Start: 2021-10-31

## 2021-11-01 NOTE — PROGRESS NOTES
Preoperative Evaluation    Date of Exam: 11/1/2021    Edmar Manuel is a 57 y.o. female (AYI:[birth year]) who presents for preoperative evaluation. Procedure/Surgery: Bilateral cataract surgery date of Procedure/Surgery is scheduled for 12/8/2021 and 12/15/2021 by Dr. Viral Guo  she denies personal or family history of complication associated with general anesthesia. Denies history of abnormal bleeding or blood transfusion. she is able to climb a flight of stairs without chest pain or severe SOB. Recent use of: No recent use of aspirin (ASA), NSAIDS or steroids Tetanus up to date: last tetanus booster within 10 years. Denies latex allergy. Allergies- reviewed: Allergies   Allergen Reactions    Iodinated Contrast Media Rash     Rash and trouble breathing  Rash and trouble breathing  Rash and trouble breathing    Other Plant, Animal, Environmental Sneezing     Pollen, mold and dust    Doxycycline Nausea and Vomiting         Medications- reviewed:   Current Outpatient Medications   Medication Sig    ketorolac (ACULAR) 0.5 % ophthalmic solution     ofloxacin (FLOXIN) 0.3 % ophthalmic solution     prednisoLONE acetate (PRED FORTE) 1 % ophthalmic suspension     cholecalciferol (VITAMIN D3) (2,000 UNITS /50 MCG) cap capsule Take  by mouth daily.  lisinopriL (PRINIVIL, ZESTRIL) 5 mg tablet Take 1 Tablet by mouth daily.  fluticasone propionate (FLONASE) 50 mcg/actuation nasal spray SPRAY 2 SPRAYS IN EACH NOSTRIL EVERY MORNING    estradioL (ESTRACE) 0.01 % (0.1 mg/gram) vaginal cream     lansoprazole (PREVACID) 15 mg capsule Take 1 Cap by mouth Daily (before breakfast).  ferrous sulfate (Iron) 325 mg (65 mg iron) tablet Take  by mouth Daily (before breakfast).     levothyroxine (SYNTHROID) 75 mcg tablet 1 tab daily Monday through friday    valACYclovir (VALTREX) 500 mg tablet TAKE 1 TABLET BY MOUTH TWICE DAILY FOR 3 DAYS AS NEEDED    CITALOPRAM HYDROBROMIDE (CITALOPRAM PO) Take 20 mg by mouth every morning.  calcium-cholecalciferol, d3, 600 mg calcium- 200 unit cap Take 600 mg by mouth. (Patient not taking: Reported on 11/1/2021)     No current facility-administered medications for this visit. Past Medical History- reviewed:  Past Medical History:   Diagnosis Date    Acute allergic rhinitis     Allergic rhinitis     Anxiety disorder     Anxiety Disorder/insomnia    Back pain     Chest pain, atypical     Chronic sinusitis     Depression     Elbow pain, right     Elevated serum gamma-glutamyl transferase level     elevated GGTP 79-no exposure to hepb/c immune to hep b    Equivocal stress echocardiogram     Stress echo feb 2015 neg, EF 55%    Fibroid, uterine     Genital herpes     GERD (gastroesophageal reflux disease)     GERD: s/p EGD 11-5-10: chronic inactive gastritis    High transaminase levels     History of colonoscopy     colonoscopy 9/09 one hypereplastic polyp recheck in 8-10 years.     HSV infection     Hydronephrosis 02/02/2016    right hydronephrosis transiently 2/2/16    Hypertension     Not on any medication    Menopause 2015    Normocytic anemia     Osteoarthritis     KNEES, ELBOWS, HANDS    Paresthesia of left upper extremity     Pelvic pain     Post-menopausal bleeding 10/2018    Thyroid disease     Tinea pedis     Visual field scotoma          Past Surgical History- reviewed:   Past Surgical History:   Procedure Laterality Date    HX COLONOSCOPY  09/18/2014    [facility] 9/18/14    HX ENDOSCOPY  2010    Salvador 11/1/2016:    HX GI  10/2019    COLONOSCOPY    HX HYSTERECTOMY  04/17/2019    PERFORMED AT     HX HYSTEROSCOPY  11/07/2018    With D&C - Mendez Storm    HX HYSTEROSCOPY WITH ENDOMETRIAL ABLATION  07/2012    HTA - Carmen Salgado    HX OTHER SURGICAL      laproscopy,btl    HX THYROIDECTOMY  10/25/2019    For benign multi lobular goiter         Social History- reviewed:  Social History     Socioeconomic History    Marital status:      Spouse name: Not on file    Number of children: 1    Years of education: 12    Highest education level: Not on file   Occupational History    Occupation: no   Tobacco Use    Smoking status: Never Smoker    Smokeless tobacco: Never Used   Vaping Use    Vaping Use: Never used   Substance and Sexual Activity    Alcohol use: No    Drug use: No    Sexual activity: Yes     Partners: Male     Comment: In a relationship. No concerns about STI   Other Topics Concern     Service Not Asked    Caffeine Concern Not Asked   Zurimic Toan Hazards Not Asked    Stress Concern Not Asked    Special Diet Not Asked    Exercise Yes    Seat Belt Yes    Blood Transfusions Not Asked    Occupational Exposure Not Asked    Sleep Concern Not Asked    Weight Concern Not Asked    Back Care Not Asked    Bike Helmet Not Asked    Self-Exams Not Asked   [address] While or After Drinking Not Asked    Ride with Intoxicated  Not Asked    Talk on Phone While Driving Not Asked    Text While Driving Not Asked    Frequently Outdoors Not Asked    Wear UV Protection Not Asked    Have Guns at Home Not Asked    Emotionally or Physically Abused Not Asked   Social History Narrative    Marital Status: in a relationship. She  her  in 2006. He was HIV positive but went to a  who prayed over him and told him that he was cured. After that he was convinced he did not have the infection anymore and refused to use safe sex. She does not know if he ever went back on treatment and does not know if he is still alive. 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 Children: 1 son, Melanie Fermin    Occupation: Disability    Patient has never smoked.      Alcohol Use - no    Smoke Dectector - Yes    Seat Belt Use - Yes     Social Determinants of Health     Financial Resource Strain:     Difficulty of Paying Living Expenses:    Food Insecurity:     Worried About Running Out of Food in the Last Year:     [address] in the Last Year:    Transportation Needs:     Lack of Transportation (Medical):  Lack of Transportation (Non-Medical):    Physical Activity:     Days of Exercise per Week:     Minutes of Exercise per Session:    Stress:     Feeling of Stress :    Social Connections:     Frequency of Communication with Friends and Family:     Frequency of Social Gatherings with Friends and Family:     Attends Congregational Services:     Active Member of Clubs or Organizations:     Attends Club or Organization Meetings:     Marital Status:    Intimate Partner Violence:     Fear of Current or Ex-Partner:     Emotionally Abused:     Physically Abused:     Sexually Abused:          Immunizations- reviewed:   Immunization History   Administered Date(s) Administered    COVID-19, PFIZER, MRNA, LNP-S, PF, 30MCG/0.3ML DOSE 04/12/2021, 05/03/2021    Hep A Vaccine 04/16/2014, 10/21/2014    Influenza Vaccine 10/04/2016    Influenza Vaccine (Quad) Mdck Pf (>2 Yrs Flucelvax QUAD 28576) 10/17/2018, 09/24/2019    Influenza Vaccine [employer]") PF (>6 Mo Flulaval, Fluarix, and >3 Yrs Afluria, Fluzone 14119) 09/25/2020    Td 06/13/2013    Tdap 09/15/2006     Flu: UTD per patient      Review of Systems   Constitutional: Negative. HENT: Negative. Eyes: Negative. Respiratory: Negative. Cardiovascular: Negative. Gastrointestinal: Negative. Genitourinary: Negative. Musculoskeletal: Negative. Skin: Negative. Neurological: Negative. Endo/Heme/Allergies: Negative. Psychiatric/Behavioral: Negative.           EXAM:   Visit Vitals  /79 (BP 1 Location: Left upper arm, BP Patient Position: Sitting, BP Cuff Size: Adult)   Pulse 75   Temp 98.2 °F (36.8 °C) (Temporal)   Resp 16   Ht 5' 1\" (1.549 m)   Wt 111 lb 3.2 oz (50.4 kg)   LMP 12/07/2014 (Approximate) Comment: Menopause at age 53   SpO2 100%   BMI 21.01 kg/m²       Gen:  Well developed, well nourished female in no acute distress  HEENT: normocephalic/atraumatic; PERRL; TM intact, translucent, and neutral BL;  oropharynx shows no erythema or exudates  Skin:  No rashes or suspicious skin lesions noted  Neck:   Supple, no lympadenopathy, no thyromegaly  Card:  RRR, no m/r/g  Chest:  CTAB, no w/r/r  Abd:  BS+, Soft, nontender/nondistended  Extr:  2+ pulses BL, no LE edema   MS:  no joint pain or swelling, full ROM, 5/5 strength BL, sensation intact  Neuro: CN II-XII grossly intact, reflexes 2+ BL  Psych:  Nl mood and affect       DIAGNOSTICS:   1. EKG: N/A  2. CXR: NA  3. Labs: Ordered today      IMPRESSION:     Pt presents for preoperative evaluation for cataract surgery. Patient is an acceptable candidate for surgery. Orders Placed This Encounter    THYROID CASCADE PROFILE     Standing Status:   Future     Number of Occurrences:   1     Standing Expiration Date:   11/1/2022    URINALYSIS W/ REFLEX CULTURE     Standing Status:   Future     Number of Occurrences:   1     Standing Expiration Date:   11/1/2022    CBC WITH AUTOMATED DIFF     Standing Status:   Future     Number of Occurrences:   1     Standing Expiration Date:   30/8/6252    METABOLIC PANEL, COMPREHENSIVE     Standing Status:   Future     Number of Occurrences:   1     Standing Expiration Date:   11/1/2022    calcium-cholecalciferol, d3, 600 mg calcium- 200 unit cap     Sig: Take 600 mg by mouth.     ketorolac (ACULAR) 0.5 % ophthalmic solution    ofloxacin (FLOXIN) 0.3 % ophthalmic solution    prednisoLONE acetate (PRED FORTE) 1 % ophthalmic suspension         Trish Hickman MD

## 2021-11-01 NOTE — PROGRESS NOTES
Patient stated name &     Chief Complaint   Patient presents with    Surg H&P     Eye surgery    [facility]    Dr. Sharonda Clemons Maintenance Due   Topic    Shingrix Vaccine Age 50> (1 of 2)    Flu Vaccine (1)       Wt Readings from Last 3 Encounters:   21 111 lb 3.2 oz (50.4 kg)   21 113 lb 3.2 oz (51.3 kg)   21 116 lb 12.8 oz (53 kg)     Temp Readings from Last 3 Encounters:   21 98.2 °F (36.8 °C) (Temporal)   21 97.6 °F (36.4 °C) (Temporal)   21 97.3 °F (36.3 °C) (Temporal)     BP Readings from Last 3 Encounters:   21 134/79   21 137/74   21 (!) 160/77     Pulse Readings from Last 3 Encounters:   21 75   21 68   21 69         Learning Assessment:  :     No flowsheet data found. Depression Screening:  :     3 most recent PHQ Screens 2021   Little interest or pleasure in doing things Not at all   Feeling down, depressed, irritable, or hopeless Not at all   Total Score PHQ 2 0       Fall Risk Assessment:  :     Fall Risk Assessment, last 12 mths 2021   Able to walk? Yes   Fall in past 12 months? 0   Do you feel unsteady? 0   Are you worried about falling 0       Abuse Screening:  :     Abuse Screening Questionnaire 2021   Do you ever feel afraid of your partner? N N   Are you in a relationship with someone who physically or mentally threatens you? N N   Is it safe for you to go home? Y Y       Coordination of Care Questionnaire:  :     1) Have you been to an emergency room, urgent care clinic since your last visit? no   Hospitalized since your last visit? no             2) Have you seen or consulted any other health care providers outside of [address] [place] [address] since your last visit? no  (Include any pap smears or colon screenings in this section.)    3) Do you have an Advance Directive on file? no  Are you interested in receiving information about Advance Directives? no    Patient is accompanied by self I have received verbal consent from [facility] to discuss any/all medical information while they are present in the room.

## 2021-11-02 LAB
ALBUMIN SERPL-MCNC: 4.1 G/DL (ref 3.5–5)
ALBUMIN/GLOB SERPL: 1.2 {RATIO} (ref 1.1–2.2)
ALP SERPL-CCNC: 82 U/L (ref 45–117)
ALT SERPL-CCNC: 33 U/L (ref 12–78)
ANION GAP SERPL CALC-SCNC: 2 MMOL/L (ref 5–15)
APPEARANCE UR: CLEAR
AST SERPL-CCNC: 19 U/L (ref 15–37)
BACTERIA URNS QL MICRO: NEGATIVE /HPF
BASOPHILS # BLD: 0 K/UL (ref 0–0.1)
BASOPHILS NFR BLD: 1 % (ref 0–1)
BILIRUB SERPL-MCNC: 0.6 MG/DL (ref 0.2–1)
BILIRUB UR QL: NEGATIVE
BUN SERPL-MCNC: 9 MG/DL (ref 6–20)
BUN/CREAT SERPL: 11 (ref 12–20)
CALCIUM SERPL-MCNC: 9.3 MG/DL (ref 8.5–10.1)
CHLORIDE SERPL-SCNC: 108 MMOL/L (ref 97–108)
CO2 SERPL-SCNC: 28 MMOL/L (ref 21–32)
COLOR UR: NORMAL
CREAT SERPL-MCNC: 0.85 MG/DL (ref 0.55–1.02)
DIFFERENTIAL METHOD BLD: NORMAL
EOSINOPHIL # BLD: 0.1 K/UL (ref 0–0.4)
EOSINOPHIL NFR BLD: 2 % (ref 0–7)
EPITH CASTS URNS QL MICRO: NORMAL /LPF
ERYTHROCYTE [DISTWIDTH] IN BLOOD BY AUTOMATED COUNT: 14.1 % (ref 11.5–14.5)
GLOBULIN SER CALC-MCNC: 3.3 G/DL (ref 2–4)
GLUCOSE SERPL-MCNC: 99 MG/DL (ref 65–100)
GLUCOSE UR STRIP.AUTO-MCNC: NEGATIVE MG/DL
HCT VFR BLD AUTO: 37 % (ref 35–47)
HGB BLD-MCNC: 11.6 G/DL (ref 11.5–16)
HGB UR QL STRIP: NEGATIVE
HYALINE CASTS URNS QL MICRO: NORMAL /LPF (ref 0–5)
IMM GRANULOCYTES # BLD AUTO: 0 K/UL (ref 0–0.04)
IMM GRANULOCYTES NFR BLD AUTO: 0 % (ref 0–0.5)
KETONES UR QL STRIP.AUTO: NEGATIVE MG/DL
LEUKOCYTE ESTERASE UR QL STRIP.AUTO: NEGATIVE
LYMPHOCYTES # BLD: 1.6 K/UL (ref 0.8–3.5)
LYMPHOCYTES NFR BLD: 30 % (ref 12–49)
MCH RBC QN AUTO: 28.7 PG (ref 26–34)
MCHC RBC AUTO-ENTMCNC: 31.4 G/DL (ref 30–36.5)
MCV RBC AUTO: 91.6 FL (ref 80–99)
MONOCYTES # BLD: 0.3 K/UL (ref 0–1)
MONOCYTES NFR BLD: 5 % (ref 5–13)
NEUTS SEG # BLD: 3.3 K/UL (ref 1.8–8)
NEUTS SEG NFR BLD: 62 % (ref 32–75)
NITRITE UR QL STRIP.AUTO: NEGATIVE
NRBC # BLD: 0 K/UL (ref 0–0.01)
NRBC BLD-RTO: 0 PER 100 WBC
PH UR STRIP: 7.5 [PH] (ref 5–8)
PLATELET # BLD AUTO: 262 K/UL (ref 150–400)
PMV BLD AUTO: 10.9 FL (ref 8.9–12.9)
POTASSIUM SERPL-SCNC: 4.2 MMOL/L (ref 3.5–5.1)
PROT SERPL-MCNC: 7.4 G/DL (ref 6.4–8.2)
PROT UR STRIP-MCNC: NEGATIVE MG/DL
RBC # BLD AUTO: 4.04 M/UL (ref 3.8–5.2)
RBC #/AREA URNS HPF: NORMAL /HPF (ref 0–5)
SODIUM SERPL-SCNC: 138 MMOL/L (ref 136–145)
SP GR UR REFRACTOMETRY: 1.01 (ref 1–1.03)
UA: UC IF INDICATED,UAUC: NORMAL
UROBILINOGEN UR QL STRIP.AUTO: 0.2 EU/DL (ref 0.2–1)
WBC # BLD AUTO: 5.3 K/UL (ref 3.6–11)
WBC URNS QL MICRO: NORMAL /HPF (ref 0–4)

## 2021-11-03 LAB — TSH SERPL-ACNC: 3.79 UIU/ML (ref 0.45–4.5)

## 2021-11-04 DIAGNOSIS — I10 ESSENTIAL HYPERTENSION: ICD-10-CM

## 2021-11-04 RX ORDER — LISINOPRIL 5 MG/1
TABLET ORAL
Qty: 90 TABLET | Refills: 1 | Status: SHIPPED | OUTPATIENT
Start: 2021-11-04 | End: 2022-04-27

## 2022-03-18 PROBLEM — Z80.0 FAMILY HISTORY OF MALIGNANT NEOPLASM OF GASTROINTESTINAL TRACT: Status: ACTIVE | Noted: 2018-04-17

## 2022-03-18 PROBLEM — D64.9 NORMOCYTIC ANEMIA: Status: ACTIVE | Noted: 2018-04-17

## 2022-03-18 PROBLEM — N95.2 POSTMENOPAUSAL ATROPHIC VAGINITIS: Status: ACTIVE | Noted: 2018-04-17

## 2022-03-18 PROBLEM — R09.A2 SENSATION OF LUMP IN THROAT: Status: ACTIVE | Noted: 2020-02-04

## 2022-03-18 PROBLEM — Z01.419 WOMEN'S ANNUAL ROUTINE GYNECOLOGICAL EXAMINATION: Status: ACTIVE | Noted: 2018-06-07

## 2022-03-18 PROBLEM — N95.0 POSTMENOPAUSAL BLEEDING: Status: ACTIVE | Noted: 2018-06-07

## 2022-03-18 PROBLEM — R22.1 SENSATION OF LUMP IN THROAT: Status: ACTIVE | Noted: 2020-02-04

## 2022-03-18 PROBLEM — Z12.4 SCREENING FOR MALIGNANT NEOPLASM OF CERVIX: Status: ACTIVE | Noted: 2018-06-07

## 2022-03-18 PROBLEM — R31.29 OTHER MICROSCOPIC HEMATURIA: Status: ACTIVE | Noted: 2018-04-17

## 2022-03-19 PROBLEM — M25.521 ELBOW PAIN, RIGHT: Status: ACTIVE | Noted: 2018-04-17

## 2022-03-19 PROBLEM — M25.561 KNEE PAIN, RIGHT: Status: ACTIVE | Noted: 2018-04-17

## 2022-03-19 PROBLEM — R10.2 PELVIC PAIN: Status: ACTIVE | Noted: 2018-04-17

## 2022-03-19 PROBLEM — M25.561 KNEE PAIN, BILATERAL: Status: ACTIVE | Noted: 2018-04-17

## 2022-03-19 PROBLEM — E04.1 THYROID NODULE: Status: ACTIVE | Noted: 2020-02-04

## 2022-03-19 PROBLEM — R07.89 CHEST PAIN, ATYPICAL: Status: ACTIVE | Noted: 2018-04-17

## 2022-03-19 PROBLEM — M25.562 KNEE PAIN, BILATERAL: Status: ACTIVE | Noted: 2018-04-17

## 2022-03-19 PROBLEM — J30.9 ALLERGIC SINUSITIS: Status: ACTIVE | Noted: 2018-07-18

## 2022-03-19 PROBLEM — F32.A DEPRESSION: Status: ACTIVE | Noted: 2018-04-17

## 2022-03-19 PROBLEM — D21.9 FIBROIDS: Status: ACTIVE | Noted: 2018-04-17

## 2022-03-19 PROBLEM — M25.521 PAIN IN JOINT OF RIGHT ELBOW: Status: ACTIVE | Noted: 2018-04-17

## 2022-03-19 PROBLEM — Z98.890 STATUS POST SURGERY: Status: ACTIVE | Noted: 2019-10-25

## 2022-03-19 PROBLEM — M25.562 KNEE PAIN, LEFT: Status: ACTIVE | Noted: 2018-04-17

## 2022-03-19 PROBLEM — G44.221 CHRONIC TENSION-TYPE HEADACHE, INTRACTABLE: Status: ACTIVE | Noted: 2020-01-14

## 2022-03-19 PROBLEM — K21.00 REFLUX ESOPHAGITIS: Status: ACTIVE | Noted: 2018-04-17

## 2022-03-19 PROBLEM — B00.9 HERPES: Status: ACTIVE | Noted: 2020-12-09

## 2022-03-19 PROBLEM — K21.9 GERD (GASTROESOPHAGEAL REFLUX DISEASE): Status: ACTIVE | Noted: 2018-04-17

## 2022-03-19 PROBLEM — J32.9 SINUSITIS, CHRONIC: Status: ACTIVE | Noted: 2018-04-17

## 2022-03-19 PROBLEM — R20.2 PARESTHESIA OF HAND, BILATERAL: Status: ACTIVE | Noted: 2018-04-17

## 2022-03-19 PROBLEM — I10 HYPERTENSION: Status: ACTIVE | Noted: 2018-04-17

## 2022-03-19 PROBLEM — R19.7 DIARRHEA: Status: ACTIVE | Noted: 2018-04-17

## 2022-03-19 PROBLEM — M17.0 OSTEOARTHRITIS OF KNEES, BILATERAL: Status: ACTIVE | Noted: 2018-04-17

## 2022-03-19 PROBLEM — N36.2 URETHRAL CARUNCLE: Status: ACTIVE | Noted: 2018-06-07

## 2022-03-19 PROBLEM — H53.419 SCOTOMA: Status: ACTIVE | Noted: 2018-04-17

## 2022-03-19 PROBLEM — R11.2 NAUSEA AND VOMITING: Status: ACTIVE | Noted: 2018-04-17

## 2022-03-20 PROBLEM — E04.2 MULTINODULAR GOITER: Status: ACTIVE | Noted: 2019-10-25

## 2022-03-20 PROBLEM — M54.50 LUMBAR BACK PAIN: Status: ACTIVE | Noted: 2018-04-17

## 2022-03-20 PROBLEM — R74.01 NONSPECIFIC ELEVATION OF LEVELS OF TRANSAMINASE AND LACTIC ACID DEHYDROGENASE (LDH): Status: ACTIVE | Noted: 2018-04-17

## 2022-03-20 PROBLEM — M25.522 PAIN IN JOINT OF LEFT ELBOW: Status: ACTIVE | Noted: 2018-04-17

## 2022-03-20 PROBLEM — R42 DIZZINESS: Status: ACTIVE | Noted: 2018-04-17

## 2022-03-20 PROBLEM — R74.02 NONSPECIFIC ELEVATION OF LEVELS OF TRANSAMINASE AND LACTIC ACID DEHYDROGENASE (LDH): Status: ACTIVE | Noted: 2018-04-17

## 2022-03-20 PROBLEM — M25.50 PAIN IN JOINT INVOLVING MULTIPLE SITES: Status: ACTIVE | Noted: 2018-04-17

## 2022-03-20 PROBLEM — K29.70 GASTRITIS: Status: ACTIVE | Noted: 2018-04-17

## 2022-04-13 ENCOUNTER — OFFICE VISIT (OUTPATIENT)
Dept: FAMILY MEDICINE CLINIC | Age: 62
End: 2022-04-13
Payer: COMMERCIAL

## 2022-04-13 VITALS
HEART RATE: 76 BPM | WEIGHT: 101.6 LBS | DIASTOLIC BLOOD PRESSURE: 71 MMHG | HEIGHT: 61 IN | OXYGEN SATURATION: 100 % | SYSTOLIC BLOOD PRESSURE: 111 MMHG | BODY MASS INDEX: 19.18 KG/M2 | RESPIRATION RATE: 16 BRPM | TEMPERATURE: 98.6 F

## 2022-04-13 DIAGNOSIS — R10.9 ABDOMINAL DISCOMFORT: Primary | ICD-10-CM

## 2022-04-13 DIAGNOSIS — B37.0 ORAL CANDIDIASIS: ICD-10-CM

## 2022-04-13 DIAGNOSIS — Z87.440 HISTORY OF UTI: ICD-10-CM

## 2022-04-13 DIAGNOSIS — Z12.31 SCREENING MAMMOGRAM, ENCOUNTER FOR: ICD-10-CM

## 2022-04-13 LAB
ALBUMIN SERPL-MCNC: 4.3 G/DL (ref 3.5–5)
ALBUMIN/GLOB SERPL: 1.2 {RATIO} (ref 1.1–2.2)
ALP SERPL-CCNC: 84 U/L (ref 45–117)
ALT SERPL-CCNC: 34 U/L (ref 12–78)
ANION GAP SERPL CALC-SCNC: 7 MMOL/L (ref 5–15)
APPEARANCE UR: ABNORMAL
AST SERPL-CCNC: 18 U/L (ref 15–37)
BACTERIA URNS QL MICRO: NEGATIVE /HPF
BASOPHILS # BLD: 0 K/UL (ref 0–0.1)
BASOPHILS NFR BLD: 1 % (ref 0–1)
BILIRUB SERPL-MCNC: 0.5 MG/DL (ref 0.2–1)
BILIRUB UR QL: NEGATIVE
BUN SERPL-MCNC: 10 MG/DL (ref 6–20)
BUN/CREAT SERPL: 7 (ref 12–20)
CALCIUM SERPL-MCNC: 9.5 MG/DL (ref 8.5–10.1)
CHLORIDE SERPL-SCNC: 104 MMOL/L (ref 97–108)
CO2 SERPL-SCNC: 25 MMOL/L (ref 21–32)
COLOR UR: ABNORMAL
CREAT SERPL-MCNC: 1.37 MG/DL (ref 0.55–1.02)
DIFFERENTIAL METHOD BLD: NORMAL
EOSINOPHIL # BLD: 0 K/UL (ref 0–0.4)
EOSINOPHIL NFR BLD: 1 % (ref 0–7)
EPITH CASTS URNS QL MICRO: ABNORMAL /LPF
ERYTHROCYTE [DISTWIDTH] IN BLOOD BY AUTOMATED COUNT: 13.9 % (ref 11.5–14.5)
GLOBULIN SER CALC-MCNC: 3.7 G/DL (ref 2–4)
GLUCOSE SERPL-MCNC: 101 MG/DL (ref 65–100)
GLUCOSE UR STRIP.AUTO-MCNC: NEGATIVE MG/DL
HCT VFR BLD AUTO: 38 % (ref 35–47)
HGB BLD-MCNC: 12.2 G/DL (ref 11.5–16)
HGB UR QL STRIP: NEGATIVE
IMM GRANULOCYTES # BLD AUTO: 0 K/UL (ref 0–0.04)
IMM GRANULOCYTES NFR BLD AUTO: 0 % (ref 0–0.5)
KETONES UR QL STRIP.AUTO: ABNORMAL MG/DL
LEUKOCYTE ESTERASE UR QL STRIP.AUTO: NEGATIVE
LYMPHOCYTES # BLD: 1.5 K/UL (ref 0.8–3.5)
LYMPHOCYTES NFR BLD: 24 % (ref 12–49)
MCH RBC QN AUTO: 28.7 PG (ref 26–34)
MCHC RBC AUTO-ENTMCNC: 32.1 G/DL (ref 30–36.5)
MCV RBC AUTO: 89.4 FL (ref 80–99)
MONOCYTES # BLD: 0.5 K/UL (ref 0–1)
MONOCYTES NFR BLD: 8 % (ref 5–13)
NEUTS SEG # BLD: 4.1 K/UL (ref 1.8–8)
NEUTS SEG NFR BLD: 66 % (ref 32–75)
NITRITE UR QL STRIP.AUTO: NEGATIVE
NRBC # BLD: 0 K/UL (ref 0–0.01)
NRBC BLD-RTO: 0 PER 100 WBC
PH UR STRIP: 5.5 [PH] (ref 5–8)
PLATELET # BLD AUTO: 306 K/UL (ref 150–400)
PMV BLD AUTO: 11.5 FL (ref 8.9–12.9)
POTASSIUM SERPL-SCNC: 4.1 MMOL/L (ref 3.5–5.1)
PROT SERPL-MCNC: 8 G/DL (ref 6.4–8.2)
PROT UR STRIP-MCNC: ABNORMAL MG/DL
RBC # BLD AUTO: 4.25 M/UL (ref 3.8–5.2)
RBC #/AREA URNS HPF: ABNORMAL /HPF (ref 0–5)
SODIUM SERPL-SCNC: 136 MMOL/L (ref 136–145)
SP GR UR REFRACTOMETRY: 1.03 (ref 1–1.03)
UA: UC IF INDICATED,UAUC: ABNORMAL
UR CULT HOLD, URHOLD: NORMAL
URATE CRY URNS QL MICRO: ABNORMAL
UROBILINOGEN UR QL STRIP.AUTO: 0.2 EU/DL (ref 0.2–1)
WBC # BLD AUTO: 6.1 K/UL (ref 3.6–11)
WBC URNS QL MICRO: ABNORMAL /HPF (ref 0–4)

## 2022-04-13 PROCEDURE — 3017F COLORECTAL CA SCREEN DOC REV: CPT | Performed by: NURSE PRACTITIONER

## 2022-04-13 PROCEDURE — G9899 SCRN MAM PERF RSLTS DOC: HCPCS | Performed by: NURSE PRACTITIONER

## 2022-04-13 PROCEDURE — G8427 DOCREV CUR MEDS BY ELIG CLIN: HCPCS | Performed by: NURSE PRACTITIONER

## 2022-04-13 PROCEDURE — 99214 OFFICE O/P EST MOD 30 MIN: CPT | Performed by: NURSE PRACTITIONER

## 2022-04-13 PROCEDURE — G8752 SYS BP LESS 140: HCPCS | Performed by: NURSE PRACTITIONER

## 2022-04-13 PROCEDURE — G9717 DOC PT DX DEP/BP F/U NT REQ: HCPCS | Performed by: NURSE PRACTITIONER

## 2022-04-13 PROCEDURE — G8420 CALC BMI NORM PARAMETERS: HCPCS | Performed by: NURSE PRACTITIONER

## 2022-04-13 PROCEDURE — G8754 DIAS BP LESS 90: HCPCS | Performed by: NURSE PRACTITIONER

## 2022-04-13 RX ORDER — ONDANSETRON 4 MG/1
TABLET, ORALLY DISINTEGRATING ORAL
COMMUNITY
Start: 2022-04-09

## 2022-04-13 RX ORDER — SULFAMETHOXAZOLE AND TRIMETHOPRIM 800; 160 MG/1; MG/1
TABLET ORAL
COMMUNITY
Start: 2022-04-09 | End: 2022-04-27

## 2022-04-13 RX ORDER — NYSTATIN 100000 [USP'U]/ML
1 SUSPENSION ORAL 4 TIMES DAILY
Qty: 100 ML | Refills: 0 | Status: SHIPPED | OUTPATIENT
Start: 2022-04-13

## 2022-04-13 NOTE — PROGRESS NOTES
Identified pt with two pt identifiers(name and ). Reviewed record in preparation for visit and have obtained necessary documentation. Chief Complaint   Patient presents with    Abdominal Pain     For x6 days; w/ vomitting         Health Maintenance Due   Topic    Shingrix Vaccine Age 49> (1 of 2)    COVID-19 Vaccine (3 - Booster for Grijalva Peter series)    Breast Cancer Screen Mammogram        Coordination of Care Questionnaire:  :   1) Have you been to an emergency room, urgent care, or hospitalized since your last visit? If yes, where when, and reason for visit? Yes, [facility] For [facility]       2. Have seen or consulted any other health care provider since your last visit? If yes, where when, and reason for visit?  no        Patient is accompanied by self I have received verbal consent from [facility] to discuss any/all medical information while they are present in the room.

## 2022-04-13 NOTE — PATIENT INSTRUCTIONS
Drink more water  [employer], no spicy or acidic foods  Imodium for frequent loose stools  Nystatin swish and spit 4 times daily for thrush  Will follow up with lab results when they return  Continued or worsening symptoms go to ER     Abdominal Pain: Care Instructions  Your Care Instructions     Abdominal pain has many possible causes. Some aren't serious and get better on their own in a few days. Others need more testing and treatment. If your pain continues or gets worse, you need to be rechecked and may need more tests to find out what is wrong. You may need surgery to correct the problem. Don't ignore new symptoms, such as fever, nausea and vomiting, urination problems, pain that gets worse, and dizziness. These may be signs of a more serious problem. Your doctor may have recommended a follow-up visit in the next 8 to 12 hours. If you are not getting better, you may need more tests or treatment. The doctor has checked you carefully, but problems can develop later. If you notice any problems or new symptoms, get medical treatment right away. Follow-up care is a key part of your treatment and safety. Be sure to make and go to all appointments, and call your doctor if you are having problems. It's also a good idea to know your test results and keep a list of the medicines you take. How can you care for yourself at home? · Rest until you feel better. · To prevent dehydration, drink plenty of fluids. Choose water and other clear liquids until you feel better. If you have kidney, heart, or liver disease and have to limit fluids, talk with your doctor before you increase the amount of fluids you drink. · If your stomach is upset, eat mild foods, such as rice, dry toast or crackers, bananas, and applesauce. Try eating several small meals instead of two or three large ones. · Wait until 48 hours after all symptoms have gone away before you have spicy foods, alcohol, and drinks that contain caffeine.   · Do not eat foods that are high in fat. · Avoid anti-inflammatory medicines such as aspirin, ibuprofen (Advil, Motrin), and naproxen (Aleve). These can cause stomach upset. Talk to your doctor if you take daily aspirin for another health problem. When should you call for help? Call 911 anytime you think you may need emergency care. For example, call if:    · You passed out (lost consciousness).     · You pass maroon or very bloody stools.     · You vomit blood or what looks like coffee grounds.     · You have new, severe belly pain. Call your doctor now or seek immediate medical care if:    · Your pain gets worse, especially if it becomes focused in one area of your belly.     · You have a new or higher fever.     · Your stools are black and look like tar, or they have streaks of blood.     · You have unexpected vaginal bleeding.     · You have symptoms of a urinary tract infection. These may include:  ? Pain when you urinate. ? Urinating more often than usual.  ? Blood in your urine.     · You are dizzy or lightheaded, or you feel like you may faint. Watch closely for changes in your health, and be sure to contact your doctor if:    · You are not getting better after 1 day (24 hours). Where can you learn more? Go to http://www.gray.com/  Enter W543 in the search box to learn more about \"Abdominal Pain: Care Instructions. \"  Current as of: July 1, 2021               Content Version: 13.2  © 4263-7835 [employer]. Care instructions adapted under license by [employer] (which disclaims liability or warranty for this information). If you have questions about a medical condition or this instruction, always ask your healthcare professional. [place] [address] any warranty or liability for your use of this information. Candidiasis: Care Instructions  Your Care Instructions  Candidiasis (say \"vql-[id number]-JB-uh-rico\") is a yeast infection.  Yeast normally lives in your body. But it can cause problems if your body's defenses don't work as they should. Some medicines can increase your chance of getting a yeast infection. These include antibiotics, steroids, and cancer drugs. And some diseases like AIDS and diabetes can make you more likely to get yeast infections. There are different types of yeast infections. [place] is a yeast infection in the mouth. It usually occurs in people with weak immune systems. It causes white patches inside the mouth and throat. Yeast infections of the skin usually occur in skin folds where the skin stays moist. They cause red, oozing patches on your skin. Babies can get these infections under the diaper. People who often wear gloves can get them on their hands. Many women get vaginal yeast infections. They are most common when women take antibiotics. These infections can cause the vagina to itch and burn. They also cause white discharge that looks like cottage cheese. In rare cases, yeast infects the blood. This can cause serious disease. This kind of infection is treated with medicine given through a needle into a vein (IV). After you start treatment, a yeast infection usually goes away quickly. But if your immune system is weak, the infection may come back. Tell your doctor if you get yeast infections often. Follow-up care is a key part of your treatment and safety. Be sure to make and go to all appointments, and call your doctor if you are having problems. It's also a good idea to know your test results and keep a list of the medicines you take. How can you care for yourself at home? · Take your medicines exactly as prescribed. Call your doctor if you think you are having a problem with your medicine. · Use antibiotics only as directed by your doctor. · Eat yogurt with live cultures. It has bacteria called lactobacillus. It may help prevent some types of yeast infections. · Keep your skin clean and dry.  Put powder on moist places. · If you are using a cream or suppository to treat a vaginal yeast infection, don't use condoms or a diaphragm. Use a different type of birth control. · Eat a healthy diet and get regular exercise. This will help keep your immune system strong. When should you call for help? Watch closely for changes in your health, and be sure to contact your doctor if:    · You do not get better as expected. Where can you learn more? Go to http://www.gray.com/  Enter G042 in the search box to learn more about \"Candidiasis: Care Instructions. \"  Current as of: November 22, 2021               Content Version: 13.2  © 2006-2022 [employer]. Care instructions adapted under license by [employer] (which disclaims liability or warranty for this information). If you have questions about a medical condition or this instruction, always ask your healthcare professional. [place] [address] any warranty or liability for your use of this information. Urinary Tract Infection (UTI) in Women: Care Instructions  Overview     A urinary tract infection, or UTI, is a general term for an infection anywhere between the kidneys and the urethra (where urine comes out). Most UTIs are bladder infections. They often cause pain or burning when you urinate. UTIs are caused by bacteria and can be cured with antibiotics. Be sure to complete your treatment so that the infection does not get worse. Follow-up care is a key part of your treatment and safety. Be sure to make and go to all appointments, and call your doctor if you are having problems. It's also a good idea to know your test results and keep a list of the medicines you take. How can you care for yourself at home? · Take your antibiotics as directed. Do not stop taking them just because you feel better. You need to take the full course of antibiotics.   · Drink extra water and other fluids for the next day or two. This will help make the urine less concentrated and help wash out the bacteria that are causing the infection. (If you have kidney, heart, or liver disease and have to limit fluids, talk with your doctor before you increase the amount of fluids you drink.)  · Avoid drinks that are carbonated or have caffeine. They can irritate the bladder. · Urinate often. Try to empty your bladder each time. · To relieve pain, take a hot bath or lay a heating pad set on low over your lower belly or genital area. Never go to sleep with a heating pad in place. To prevent UTIs  · Drink plenty of water each day. This helps you urinate often, which clears bacteria from your system. (If you have kidney, heart, or liver disease and have to limit fluids, talk with your doctor before you increase the amount of fluids you drink.)  · Urinate when you need to. · If you are sexually active, urinate right after you have sex. · Change sanitary pads often. · Avoid douches, bubble baths, feminine hygiene sprays, and other feminine hygiene products that have deodorants. · After going to the bathroom, wipe from front to back. When should you call for help? Call your doctor now or seek immediate medical care if:    · Symptoms such as fever, chills, nausea, or vomiting get worse or appear for the first time.     · You have new pain in your back just below your rib cage. This is called flank pain.     · There is new blood or pus in your urine.     · You have any problems with your antibiotic medicine. Watch closely for changes in your health, and be sure to contact your doctor if:    · You are not getting better after taking an antibiotic for 2 days.     · Your symptoms go away but then come back. Where can you learn more? Go to http://www.gray.com/  Enter K801 in the search box to learn more about \"Urinary Tract Infection (UTI) in Women: Care Instructions. \"  Current as of: October 18, 2021               Content Version: 13.2  © 6679-0638 [employer]. Care instructions adapted under license by [employer] (which disclaims liability or warranty for this information). If you have questions about a medical condition or this instruction, always ask your healthcare professional. [address] any warranty or liability for your use of this information.

## 2022-04-13 NOTE — PROGRESS NOTES
Carmelo Downing is a 57 y.o. female who presents to clinic today for the following:    Chief Complaint   Patient presents with    Abdominal Pain     For x6 days; w/ vomitting        Vitals:    04/13/22 1018   BP: 111/71   Pulse: 76   Resp: 16   Temp: 98.6 °F (37 °C)   TempSrc: Temporal   SpO2: 100%   Weight: 101 lb 9.6 oz (46.1 kg)   Height: 5' 1\" (1.549 m)       Body mass index is 19.2 kg/m². Patients past medical, surgical and family histories were reviewed. Allergies and Medications reviewed and updated. Current Outpatient Medications:     ondansetron (ZOFRAN ODT) 4 mg disintegrating tablet, , Disp: , Rfl:     trimethoprim-sulfamethoxazole (BACTRIM DS, SEPTRA DS) 160-800 mg per tablet, , Disp: , Rfl:     nystatin (MYCOSTATIN) 100,000 unit/mL suspension, Take 5 mL by mouth four (4) times daily. swish and spit  Indications: thrush, Disp: 100 mL, Rfl: 0    lisinopriL (PRINIVIL, ZESTRIL) 5 mg tablet, TAKE 1 TABLET BY MOUTH EVERY DAY, Disp: 90 Tablet, Rfl: 1    cholecalciferol (VITAMIN D3) (2,000 UNITS /50 MCG) cap capsule, Take  by mouth daily. , Disp: , Rfl:     fluticasone propionate (FLONASE) 50 mcg/actuation nasal spray, SPRAY 2 SPRAYS IN EACH NOSTRIL EVERY MORNING, Disp: 3 Bottle, Rfl: 5    estradioL (ESTRACE) 0.01 % (0.1 mg/gram) vaginal cream, , Disp: , Rfl:     lansoprazole (PREVACID) 15 mg capsule, Take 1 Cap by mouth Daily (before breakfast). , Disp: 90 Cap, Rfl: 5    ferrous sulfate (Iron) 325 mg (65 mg iron) tablet, Take  by mouth Daily (before breakfast). , Disp: , Rfl:     levothyroxine (SYNTHROID) 75 mcg tablet, 1 tab daily Monday through friday, Disp: , Rfl:     valACYclovir (VALTREX) 500 mg tablet, TAKE 1 TABLET BY MOUTH TWICE DAILY FOR 3 DAYS AS NEEDED, Disp: 18 Tab, Rfl: 1    calcium-cholecalciferol, d3, 600 mg calcium- 200 unit cap, Take 600 mg by mouth.  (Patient not taking: Reported on 11/1/2021), Disp: , Rfl:     ketorolac (ACULAR) 0.5 % ophthalmic solution, , Disp: , Rfl:     ofloxacin (FLOXIN) 0.3 % ophthalmic solution, , Disp: , Rfl:     prednisoLONE acetate (PRED FORTE) 1 % ophthalmic suspension, , Disp: , Rfl:     CITALOPRAM HYDROBROMIDE (CITALOPRAM PO), Take 20 mg by mouth every morning. (Patient not taking: Reported on 4/13/2022), Disp: , Rfl:     Allergies   Allergen Reactions    Iodinated Contrast Media Rash     Rash and trouble breathing  Rash and trouble breathing  Rash and trouble breathing    Other Plant, Animal, Environmental Sneezing     Pollen, mold and dust    Doxycycline Nausea and Vomiting       Past Medical History:   Diagnosis Date    Acute allergic rhinitis     Allergic rhinitis     Anxiety disorder     Anxiety Disorder/insomnia    Back pain     Chest pain, atypical     Chronic sinusitis     Depression     Elbow pain, right     Elevated serum gamma-glutamyl transferase level     elevated GGTP 79-no exposure to hepb/c immune to hep b    Equivocal stress echocardiogram     Stress echo feb 2015 neg, EF 55%    Fibroid, uterine     Genital herpes     GERD (gastroesophageal reflux disease)     GERD: s/p EGD 11-5-10: chronic inactive gastritis    High transaminase levels     History of colonoscopy     colonoscopy 9/09 one hypereplastic polyp recheck in 8-10 years.     HSV infection     Hydronephrosis 02/02/2016    right hydronephrosis transiently 2/2/16    Hypertension     Not on any medication    Menopause 2015    Normocytic anemia     Osteoarthritis     KNEES, ELBOWS, HANDS    Paresthesia of left upper extremity     Pelvic pain     Post-menopausal bleeding 10/2018    Thyroid disease     Tinea pedis     Visual field scotoma        Past Surgical History:   Procedure Laterality Date    HX CATARACT REMOVAL Bilateral 12/2021    HX COLONOSCOPY  09/18/2014    [facility] 9/18/14    HX ENDOSCOPY  2010    [place] 11/1/2016:    HX GI  10/2019    COLONOSCOPY    HX HYSTERECTOMY  04/17/2019    [facility]FOR[facility] HYSTEROSCOPY  11/07/2018    With D&C - Tomy Kelly    HX HYSTEROSCOPY WITH ENDOMETRIAL ABLATION  07/2012    [facility] - [facility] Greene    HX OTHER SURGICAL      laproscopy,btl    HX THYROIDECTOMY  10/25/2019    For benign multi lobular goiter       Family History   Problem Relation Age of Onset    Dementia Mother         Mother Melissa Cristina.) - Has Family History of Other Medical Problems - dementia - Entered On: 2/2/2016    Hypertension Mother         Mother Melissa Cristina.) - Has Family History of Hypertension - Entered On: 11/18/2014    Arthritis Mother         Mother Melissa Cristina.) - Has Family History of Arthritis - Entered On: 11/19/2014    Lupus Sister     Thyroid Disease Sister     Colon Cancer Brother         Brother (full) - Has Family History of Colon Cancer - Entered On: 3/3/2015    Cancer Maternal Grandmother         MGM - Has Family History of Other Cancer - bladder - Entered On: 10/6/2015    Arthritis Maternal Grandmother     Thyroid Disease Sister         Sister (full) - Has Family History of Thyroid Disorder - Entered On: 12/23/2014    No Known Problems Brother     No Known Problems Son     Anesth Problems Neg Hx        Social History     Socioeconomic History    Marital status:      Spouse name: Not on file    Number of children: 1    Years of education: 12    Highest education level: Not on file   Occupational History    Occupation: no   Tobacco Use    Smoking status: Never Smoker    Smokeless tobacco: Never Used   Vaping Use    Vaping Use: Never used   Substance and Sexual Activity    Alcohol use: No    Drug use: No    Sexual activity: Yes     Partners: Male     Comment: In a relationship.  No concerns about STI   Other Topics Concern     Service Not Asked    Caffeine Concern Not Asked   Homa Mel[place] Hazards Not Asked    Stress Concern Not Asked    Special Diet Not Asked    Exercise Yes    Seat Belt Yes    Blood Transfusions Not Asked    Occupational Exposure Not Asked    Sleep Concern Not Asked    Weight Concern Not Asked    Back Care Not Asked    Bike Helmet Not Asked    Self-Exams Not Asked   [address] While or After Drinking Not Asked    Ride with Intoxicated  Not Asked    Talk on Phone While Driving Not Asked    Text While Driving Not Asked    Frequently Outdoors Not Asked    Wear UV Protection Not Asked    Have Guns at Home Not Asked    Emotionally or Physically Abused Not Asked   Social History Narrative    Marital Status: in a relationship. She  her  in 2006. He was HIV positive but went to a  who prayed over him and told him that he was cured. After that he was convinced he did not have the infection anymore and refused to use safe sex. She does not know if he ever went back on treatment and does not know if he is still alive. 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 Children: 1 son, Arsalan Garay    Occupation: Disability    Patient has never smoked. Alcohol Use - no    Smoke Dectector - Yes    Seat Belt Use - Yes     Social Determinants of Health     Financial Resource Strain:     Difficulty of Paying Living Expenses: Not on file   Food Insecurity:     Worried About Running Out of Food in the Last Year: Not on file    Lucretia of Food in the Last Year: Not on file   Transportation Needs:     Lack of Transportation (Medical): Not on file    Lack of Transportation (Non-Medical):  Not on file   Physical Activity:     Days of Exercise per Week: Not on file    Minutes of Exercise per Session: Not on file   Stress:     Feeling of Stress : Not on file   Social Connections:     Frequency of Communication with Friends and Family: Not on file    Frequency of Social Gatherings with Friends and Family: Not on file    Attends Anabaptism Services: Not on file   [employer] of Clubs or Organizations: Not on file    Attends Club or Organization Meetings: Not on file    Marital Status: Not on file   Intimate Partner Violence:     Fear of Current or Ex-Partner: Not on file    Emotionally Abused: Not on file    Physically Abused: Not on file    Sexually Abused: Not on file   Housing Stability:     Unable to Pay for Housing in the Last Year: Not on file    Number of [place] in the Last Year: Not on file    Unstable Housing in the Last Year: Not on file           Physical Exam  Vitals and nursing note reviewed. Constitutional:       Appearance: She is normal weight. HENT:      Nose: Nose normal.      Mouth/Throat:      Mouth: Mucous membranes are moist.   Eyes:      Pupils: Pupils are equal, round, and reactive to light. Cardiovascular:      Rate and Rhythm: Normal rate and regular rhythm. Pulses: Normal pulses. Heart sounds: Normal heart sounds. Pulmonary:      Effort: Pulmonary effort is normal.      Breath sounds: Normal breath sounds. Abdominal:      General: Abdomen is flat. There is no distension. Tenderness: There is no abdominal tenderness. Musculoskeletal:         General: Normal range of motion. Cervical back: Normal range of motion. Skin:     General: Skin is warm. Capillary Refill: Capillary refill takes less than 2 seconds. Neurological:      General: No focal deficit present. Mental Status: She is alert and oriented to person, place, and time. Psychiatric:         Mood and Affect: Mood normal.         Behavior: Behavior normal.          Patient was seen in office with a complaint of generalized abdominal discomfort. [facility] and [facility] with [facility]. She also had the death of her father on Monday. She reports her acid reflux has been worse. She feels this may be from the added stress. She feels this may be contributing to the abdominal discomfort.   She reports drinking a lot of apple juice to try to soothe her stomach. This has resulted in softer stools. I recommended that she back off of the apple juice and try Pedialyte and regular water for hydration. We discussed the need to eat spicy and acidic foods from diet. We discussed a bland diet. She denies any continued increased frequency, burning, discoloration or odor of urine. She did complete her Bactrim. She does have some continued nausea. She does have Zofran on hand for this. I recommended Imodium to help slow bowel movements. I recommended a probiotic in her diet. She does have oral thrush. Nystatin has been prescribed for this. I have ordered routine labs and a follow-up urinalysis with culture to be sure bladder infection is cleared. I recommended if symptoms continue or worsen she should go to the emergency room. we will follow-up with results when they return. Review of Systems   Constitutional: Negative for fever and malaise/fatigue. HENT: Negative. Negative for congestion, sinus pain and sore throat. Eyes: Negative. Respiratory: Negative for cough and shortness of breath. Cardiovascular: Negative for chest pain. Gastrointestinal: Positive for diarrhea, heartburn and nausea. Negative for vomiting. Genitourinary: Negative for dysuria. Musculoskeletal: Negative. Skin: Negative. Neurological: Negative for dizziness and headaches. Endo/Heme/Allergies: Negative for environmental allergies. Psychiatric/Behavioral: The patient is nervous/anxious. No results found. No results found for this or any previous visit (from the past 24 hour(s)). Assessment and Plan:    Encounter Diagnoses   Name Primary?  Abdominal discomfort Yes    Oral candidiasis     History of UTI     Screening mammogram, encounter for                 I have discussed the diagnosis with the patient and the intended plan as seen in the above orders. she has expressed understanding.   The patient has received an after-visit summary and questions were answered concerning future plans. I have discussed medication side effects and warnings with the patient as well.         Electronically Signed: Meena Suh NP

## 2022-04-14 NOTE — PROGRESS NOTES
Your kidney function was reduced. Please drink more water. We will need to see you again within 2 weeks to repeat this test.  Please schedule.

## 2022-04-27 DIAGNOSIS — I10 ESSENTIAL HYPERTENSION: ICD-10-CM

## 2022-04-27 RX ORDER — LISINOPRIL 5 MG/1
TABLET ORAL
Qty: 90 TABLET | Refills: 1 | Status: SHIPPED | OUTPATIENT
Start: 2022-04-27 | End: 2022-10-17

## 2022-05-05 ENCOUNTER — OFFICE VISIT (OUTPATIENT)
Dept: FAMILY MEDICINE CLINIC | Age: 62
End: 2022-05-05
Payer: COMMERCIAL

## 2022-05-05 VITALS
SYSTOLIC BLOOD PRESSURE: 136 MMHG | HEART RATE: 68 BPM | BODY MASS INDEX: 19.33 KG/M2 | OXYGEN SATURATION: 99 % | RESPIRATION RATE: 20 BRPM | TEMPERATURE: 97.6 F | HEIGHT: 61 IN | DIASTOLIC BLOOD PRESSURE: 69 MMHG | WEIGHT: 102.4 LBS

## 2022-05-05 DIAGNOSIS — R14.3 FLATULENCE: ICD-10-CM

## 2022-05-05 DIAGNOSIS — R79.89 ELEVATED SERUM CREATININE: Primary | ICD-10-CM

## 2022-05-05 PROCEDURE — 99214 OFFICE O/P EST MOD 30 MIN: CPT | Performed by: FAMILY MEDICINE

## 2022-05-05 RX ORDER — SIMETHICONE 80 MG
80 TABLET,CHEWABLE ORAL
Qty: 30 TABLET | Refills: 0 | Status: SHIPPED | OUTPATIENT
Start: 2022-05-05

## 2022-05-05 NOTE — PROGRESS NOTES
Patient stated name &     Chief Complaint   Patient presents with    Labs     Kidney functions & weight    Skin Problem     Mole on back        Health Maintenance Due   Topic    Shingrix Vaccine Age 49> (1 of 2)    COVID-19 Vaccine (3 - Booster for Motorator series)    Breast Cancer Screen Mammogram     Medicare Yearly Exam        Wt Readings from Last 3 Encounters:   22 102 lb 6.4 oz (46.4 kg)   22 101 lb 9.6 oz (46.1 kg)   21 111 lb 3.2 oz (50.4 kg)     Temp Readings from Last 3 Encounters:   22 97.6 °F (36.4 °C) (Temporal)   22 98.6 °F (37 °C) (Temporal)   21 98.2 °F (36.8 °C) (Temporal)     BP Readings from Last 3 Encounters:   22 136/69   22 111/71   21 134/79     Pulse Readings from Last 3 Encounters:   22 68   22 76   21 75         Learning Assessment:  :     No flowsheet data found. Depression Screening:  :     3 most recent PHQ Screens 2021   Little interest or pleasure in doing things Not at all   Feeling down, depressed, irritable, or hopeless Not at all   Total Score PHQ 2 0       Fall Risk Assessment:  :     Fall Risk Assessment, last 12 mths 2021   Able to walk? Yes   Fall in past 12 months? 0   Do you feel unsteady? 0   Are you worried about falling 0       Abuse Screening:  :     Abuse Screening Questionnaire 2021   Do you ever feel afraid of your partner? N N   Are you in a relationship with someone who physically or mentally threatens you? N N   Is it safe for you to go home? Y Y       Coordination of Care Questionnaire:  :     1) Have you been to an emergency room, urgent care clinic since your last visit? no   Hospitalized since your last visit? no             2) Have you seen or consulted any other health care providers outside of [address] [place] [address] since your last visit? no  (Include any pap smears or colon screenings in this section.)    3) Do you have an Advance Directive on file? no  Are you interested in receiving information about Advance Directives? no    Patient is accompanied by self I have received verbal consent from [facility] to discuss any/all medical information while they are present in the room.

## 2022-05-05 NOTE — PROGRESS NOTES
2022   Manisha Manuel (: 1960) is a 57 y.o. female, established patient, here for evaluation of the following chief complaint(s):  Labs (Kidney functions & weight) and Skin Problem (Mole on back)     ASSESSMENT/PLAN:  Below is the assessment and plan developed based on review of pertinent history, physical exam, labs, studies, and medications. 1. Elevated serum creatinine  -     METABOLIC PANEL, COMPREHENSIVE; Future  2. Flatulence  -     simethicone (MYLICON) 80 mg chewable tablet; Take 1 Tablet by mouth every six (6) hours as needed for Flatulence., Normal, Disp-30 Tablet, R-0    Return in about 2 weeks (around 2022) for ANNUAL EXAM.    SUBJECTIVE/OBJECTIVE:  HPI   1. Elevated serum creatinine  Serum creatinine evaluated at last lab check. She is here for follow-up. She was advised to increase fluid intake. She is requesting labs done today. She reports all her previous symptoms of nausea and abdominal pain have currently resolved. Lab Results   Component Value Date/Time    Sodium 136 2022 10:57 AM    Potassium 4.1 2022 10:57 AM    Chloride 104 2022 10:57 AM    CO2 25 2022 10:57 AM    Anion gap 7 2022 10:57 AM    Glucose 101 (H) 2022 10:57 AM    BUN 10 2022 10:57 AM    Creatinine 1.37 (H) 2022 10:57 AM    BUN/Creatinine ratio 7 (L) 2022 10:57 AM    GFR est AA 47 (L) 2022 10:57 AM    GFR est non-AA 39 (L) 2022 10:57 AM    Calcium 9.5 2022 10:57 AM     No results found for: MCACR, MCA1, MCA2, MCA3, MCAU, MCAU2, MCALPOCT  2. Flatulence  She is taking Prevacid. Reports heartburn. Also has gas. I will start simethicone. Advised to follow-up if no improvement. Denies chest pain, abdominal pain, nausea or vomiting. No results found for any visits on 22. Review of Systems   Constitutional: Negative. HENT: Negative. Eyes: Negative. Respiratory: Negative. Cardiovascular: Negative. Gastrointestinal: Negative. Genitourinary: Negative. Musculoskeletal: Negative. Skin: Negative. Neurological: Negative. Endo/Heme/Allergies: Negative. Psychiatric/Behavioral: Negative. Physical Exam  Vitals and nursing note reviewed. HENT:      Head: Normocephalic and atraumatic. Right Ear: External ear normal.      Left Ear: External ear normal.      Nose: Nose normal.   Eyes:      Conjunctiva/sclera: Conjunctivae normal.   Cardiovascular:      Rate and Rhythm: Normal rate and regular rhythm. Pulmonary:      Effort: Pulmonary effort is normal.      Breath sounds: Normal breath sounds. Abdominal:      General: Bowel sounds are normal. There is no distension. Palpations: Abdomen is soft. Tenderness: There is no abdominal tenderness. Musculoskeletal:         General: Normal range of motion. Cervical back: Normal range of motion and neck supple. Right lower leg: No edema. Left lower leg: No edema. Skin:     General: Skin is warm and dry. Neurological:      General: No focal deficit present. Mental Status: She is alert. /69 (BP 1 Location: Right arm, BP Patient Position: Sitting, BP Cuff Size: Adult)   Pulse 68   Temp 97.6 °F (36.4 °C) (Temporal)   Resp 20   Ht 5' 1\" (1.549 m)   Wt 102 lb 6.4 oz (46.4 kg)   LMP 12/07/2014 (Approximate) Comment: Menopause at age 53  SpO2 99%   BMI 19.35 kg/m²     Allergies   Allergen Reactions    Iodinated Contrast Media Rash     Rash and trouble breathing  Rash and trouble breathing  Rash and trouble breathing    Other Plant, Animal, Environmental Sneezing     Pollen, mold and dust    Doxycycline Nausea and Vomiting       Current Outpatient Medications   Medication Sig    simethicone (MYLICON) 80 mg chewable tablet Take 1 Tablet by mouth every six (6) hours as needed for Flatulence.     lisinopriL (PRINIVIL, ZESTRIL) 5 mg tablet TAKE 1 TABLET BY MOUTH EVERY DAY    ondansetron (ZOFRAN ODT) 4 mg disintegrating tablet     nystatin (MYCOSTATIN) 100,000 unit/mL suspension Take 5 mL by mouth four (4) times daily. swish and spit  Indications: thrush    cholecalciferol (VITAMIN D3) (2,000 UNITS /50 MCG) cap capsule Take  by mouth daily.  fluticasone propionate (FLONASE) 50 mcg/actuation nasal spray SPRAY 2 SPRAYS IN EACH NOSTRIL EVERY MORNING    estradioL (ESTRACE) 0.01 % (0.1 mg/gram) vaginal cream     lansoprazole (PREVACID) 15 mg capsule Take 1 Cap by mouth Daily (before breakfast).  ferrous sulfate (Iron) 325 mg (65 mg iron) tablet Take  by mouth Daily (before breakfast).  levothyroxine (SYNTHROID) 75 mcg tablet 1 tab daily Monday through friday    valACYclovir (VALTREX) 500 mg tablet TAKE 1 TABLET BY MOUTH TWICE DAILY FOR 3 DAYS AS NEEDED    calcium-cholecalciferol, d3, 600 mg calcium- 200 unit cap Take 600 mg by mouth. (Patient not taking: Reported on 11/1/2021)    ketorolac (ACULAR) 0.5 % ophthalmic solution  (Patient not taking: Reported on 4/13/2022)    ofloxacin (FLOXIN) 0.3 % ophthalmic solution  (Patient not taking: Reported on 4/13/2022)    prednisoLONE acetate (PRED FORTE) 1 % ophthalmic suspension  (Patient not taking: Reported on 4/13/2022)    CITALOPRAM HYDROBROMIDE (CITALOPRAM PO) Take 20 mg by mouth every morning. (Patient not taking: Reported on 4/13/2022)     No current facility-administered medications for this visit.        Past Medical History:   Diagnosis Date    Acute allergic rhinitis     Allergic rhinitis     Anxiety disorder     Anxiety Disorder/insomnia    Back pain     Chest pain, atypical     Chronic sinusitis     Depression     Elbow pain, right     Elevated serum gamma-glutamyl transferase level     elevated GGTP 79-no exposure to hepb/c immune to hep b    Equivocal stress echocardiogram     Stress echo feb 2015 neg, EF 55%    Fibroid, uterine     Genital herpes     GERD (gastroesophageal reflux disease)     GERD: s/p EGD 11-5-10: chronic inactive gastritis    High transaminase levels     History of colonoscopy     colonoscopy 9/09 one hypereplastic polyp recheck in 8-10 years.  HSV infection     Hydronephrosis 02/02/2016    right hydronephrosis transiently 2/2/16    Hypertension     Not on any medication    Menopause 2015    Normocytic anemia     Osteoarthritis     KNEES, ELBOWS, HANDS    Paresthesia of left upper extremity     Pelvic pain     Post-menopausal bleeding 10/2018    Thyroid disease     Tinea pedis     Visual field scotoma        Past Surgical History:   Procedure Laterality Date    HX CATARACT REMOVAL Bilateral 12/2021    HX COLONOSCOPY  09/18/2014    [facility] 9/18/14    HX ENDOSCOPY  2010    [facility] 11/1/2016:    HX GI  10/2019    COLONOSCOPY    HX HYSTERECTOMY  04/17/2019    PERFORMED AT     HX HYSTEROSCOPY  11/07/2018    With D&C - Maya Arriola    HX HYSTEROSCOPY WITH ENDOMETRIAL ABLATION  07/2012    [facility] - [facility]    HX OTHER SURGICAL      laproscopy,btl    HX THYROIDECTOMY  10/25/2019    For benign multi lobular goiter       Social History:  reports that she has never smoked. She has never used smokeless tobacco. She reports that she does not drink alcohol and does not use drugs. Patient Care Team:  Lia Kevin MD as PCP - General (Family Medicine)  Lia Kevin MD as PCP - [facility] Empaneled Provider  Shreya Malhotra MD (Endocrinology Physician)    Problem List  Date Reviewed: 5/5/2022          Codes Class Noted    Herpes ICD-10-CM: B00.9  ICD-9-CM: 054.9  12/9/2020        Thyroid nodule ICD-10-CM: E04.1  ICD-9-CM: 241.0  2/4/2020    Overview Signed 2/4/2020  5:23 PM by Barron Juarez MD     She will need a biopsy and I recommend that she get that through an endocrinologist.  Apparently she will be down in [place] for quite some time so we will try to get her hooked up with the endocrinologist who takes care of her aunt.              Sensation of lump in throat ICD-10-CM: R22.1  ICD-9-CM: 784.99  2/4/2020    Overview Signed 2/4/2020  5:23 PM by Emily Moore MD     I suspect this is from some occult reflux but we will get a swallow study to rule out lesions lower than my exam could appreciate. We will call her with the results. The patient understood and agreed.              Chronic tension-type headache, intractable ICD-10-CM: N16.775  ICD-9-CM: 339.12  1/14/2020        Status post surgery ICD-10-CM: Z98.890  ICD-9-CM: V45.89  10/25/2019        Multinodular goiter ICD-10-CM: E04.2  ICD-9-CM: 241.1  10/25/2019    Overview Signed 12/9/2020  8:14 PM by SHEILA Ba     Follows with Dr. Sav Nascimento through [place] [id number] [facility]              Allergic sinusitis ICD-10-CM: J30.9  ICD-9-CM: 477.9  7/18/2018        Women's annual routine gynecological examination ICD-10-CM: I60.603  ICD-9-CM: V72.31  6/7/2018        Postmenopausal bleeding ICD-10-CM: N95.0  ICD-9-CM: 627.1  6/7/2018        Screening for malignant neoplasm of cervix ICD-10-CM: Z12.4  ICD-9-CM: V76.2  6/7/2018        Urethral caruncle ICD-10-CM: N36.2  ICD-9-CM: 599.3  6/7/2018        Pain in joint of right elbow ICD-10-CM: M25.521  ICD-9-CM: 719.42  4/17/2018        Pain in joint of left elbow ICD-10-CM: M25.522  ICD-9-CM: 719.42  4/17/2018        Knee pain, left ICD-10-CM: M25.562  ICD-9-CM: 719.46  4/17/2018        Knee pain, right ICD-10-CM: M25.561  ICD-9-CM: 719.46  4/17/2018        Diarrhea ICD-10-CM: R19.7  ICD-9-CM: 787.91  4/17/2018    Overview Signed 4/17/2018 10:05 AM by Georgette Banda     Diarrhea, recurrent - reasonably short lived, no blod             Nausea and vomiting ICD-10-CM: R11.2  ICD-9-CM: 787.01  4/17/2018        Knee pain, bilateral ICD-10-CM: M25.561, M25.562  ICD-9-CM: 719.46  4/17/2018        Osteoarthritis of knees, bilateral ICD-10-CM: M17.0  ICD-9-CM: 715.96  4/17/2018        Elbow pain, right ICD-10-CM: M25.521  ICD-9-CM: 719.42  4/17/2018        Hypertension ICD-10-CM: I10  ICD-9-CM: 401.9  4/17/2018        Lumbar back pain ICD-10-CM: M54.50  ICD-9-CM: 724.2  4/17/2018        Other microscopic hematuria ICD-10-CM: R31.29  ICD-9-CM: 599.72  4/17/2018        Postmenopausal atrophic vaginitis ICD-10-CM: N95.2  ICD-9-CM: 627.3  4/17/2018        Scotoma ICD-10-CM: H53.419  ICD-9-CM: 368.44  4/17/2018    Overview Signed 4/17/2018 10:20 AM by Fredick Mcardle     Visual scotomata             Pelvic pain ICD-10-CM: R10.2  ICD-9-CM: IRL1418  4/17/2018        Paresthesia of hand, bilateral ICD-10-CM: R20.2  ICD-9-CM: 782.0  4/17/2018    Overview Signed 4/17/2018 10:22 AM by Fredick Mcardle     Paresthesia, hands, left into left upper inner arm             Family history of malignant neoplasm of gastrointestinal tract ICD-10-CM: Z80.0  ICD-9-CM: V16.0  4/17/2018    Overview Signed 4/17/2018 10:25 AM by Fredick Mcardle     Brother in his 46s             Pain in joint involving multiple sites ICD-10-CM: M25.50  ICD-9-CM: 719.49  4/17/2018    Overview Signed 4/17/2018 10:26 AM by Fredick Mcardle     Wrists, PIP joints of the fingers, ankles,  knees             Depression ICD-10-CM: F32. A  ICD-9-CM: 465  4/17/2018    Overview Signed 12/9/2020  8:13 PM by Greg Guerra &  Dr. Walter Mitchell (therapist)              Dizziness ICD-10-CM: R42  ICD-9-CM: 780.4  4/17/2018        GERD (gastroesophageal reflux disease) ICD-10-CM: K21.9  ICD-9-CM: 530.81  4/17/2018    Overview Signed 12/9/2020  8:15 PM by SHEILA Bedolla     Follows with GI through [facility], NP              Gastritis ICD-10-CM: K29.70  ICD-9-CM: 535.50  4/17/2018        Reflux esophagitis ICD-10-CM: K21.00  ICD-9-CM: 530.11  4/17/2018        Chest pain, atypical ICD-10-CM: R07.89  ICD-9-CM: 786.59  4/17/2018        Normocytic anemia ICD-10-CM: D64.9  ICD-9-CM: 285.9  4/17/2018        Nonspecific elevation of levels of transaminase and lactic acid dehydrogenase (LDH) ICD-10-CM: R74.01, R74.02  ICD-9-CM: 790.4  4/17/2018        Sinusitis, chronic ICD-10-CM: J32.9  ICD-9-CM: 473.9  4/17/2018        Fibroids ICD-10-CM: D21.9  ICD-9-CM: 215.9  4/17/2018    Overview Signed 4/17/2018 10:31 AM by Juma Batista     Fibroids, uterus                        I ADVISED PATIENT TO GO TO ER IF SYMPTOMS WORSEN , CHANGE OR FAILS TO IMPROVE. I have discussed the diagnosis with the patient and the intended plan as seen in the above orders. The patient has received an after-visit summary and questions were answered concerning future plans. I have discussed medication side effects and warnings with the patient as well. The patient agrees and understands above plan. An electronic signature was used to authenticate this note.   -- Thao Jones MD

## 2022-05-06 LAB
ALBUMIN SERPL-MCNC: 3.9 G/DL (ref 3.5–5)
ALBUMIN/GLOB SERPL: 1.3 {RATIO} (ref 1.1–2.2)
ALP SERPL-CCNC: 63 U/L (ref 45–117)
ALT SERPL-CCNC: 29 U/L (ref 12–78)
ANION GAP SERPL CALC-SCNC: 10 MMOL/L (ref 5–15)
AST SERPL-CCNC: 20 U/L (ref 15–37)
BILIRUB SERPL-MCNC: 0.3 MG/DL (ref 0.2–1)
BUN SERPL-MCNC: 12 MG/DL (ref 6–20)
BUN/CREAT SERPL: 14 (ref 12–20)
CALCIUM SERPL-MCNC: 8.9 MG/DL (ref 8.5–10.1)
CHLORIDE SERPL-SCNC: 106 MMOL/L (ref 97–108)
CO2 SERPL-SCNC: 23 MMOL/L (ref 21–32)
CREAT SERPL-MCNC: 0.83 MG/DL (ref 0.55–1.02)
GLOBULIN SER CALC-MCNC: 3 G/DL (ref 2–4)
GLUCOSE SERPL-MCNC: 88 MG/DL (ref 65–100)
POTASSIUM SERPL-SCNC: 4.1 MMOL/L (ref 3.5–5.1)
PROT SERPL-MCNC: 6.9 G/DL (ref 6.4–8.2)
SODIUM SERPL-SCNC: 139 MMOL/L (ref 136–145)

## 2022-05-19 LAB — SARS-COV-2, NAA: NOT DETECTED

## 2022-05-27 DIAGNOSIS — K21.00 GASTROESOPHAGEAL REFLUX DISEASE WITH ESOPHAGITIS, UNSPECIFIED WHETHER HEMORRHAGE: ICD-10-CM

## 2022-05-27 DIAGNOSIS — K29.70 GASTRITIS WITHOUT BLEEDING, UNSPECIFIED CHRONICITY, UNSPECIFIED GASTRITIS TYPE: ICD-10-CM

## 2022-05-31 RX ORDER — CALC/MAG/B COMPLEX/D3/HERB 61
15 TABLET ORAL
Qty: 90 CAPSULE | Refills: 5 | Status: SHIPPED | OUTPATIENT
Start: 2022-05-31

## 2022-07-29 DIAGNOSIS — J30.9 ALLERGIC SINUSITIS: ICD-10-CM

## 2022-08-03 RX ORDER — FLUTICASONE PROPIONATE 50 MCG
SPRAY, SUSPENSION (ML) NASAL
Qty: 1 EACH | Refills: 1 | Status: SHIPPED | OUTPATIENT
Start: 2022-08-03 | End: 2022-08-29

## 2022-08-03 NOTE — TELEPHONE ENCOUNTER
Please advise!!    .PCP: Toney Jacob MD    Last appt: 5/5/2022  No future appointments.     Requested Prescriptions     Pending Prescriptions Disp Refills    fluticasone propionate (FLONASE) 50 mcg/actuation nasal spray [Pharmacy Med Name: FLUTICASONE PROP 50 MCG SPRAY]  5     Sig: SPRAY 2 SPRAYS IN EACH NOSTRIL EVERY MORNING       Prior labs and Blood pressures:  BP Readings from Last 3 Encounters:   05/05/22 136/69   04/13/22 111/71   11/01/21 134/79     Lab Results   Component Value Date/Time    Sodium 139 05/05/2022 12:45 PM    Potassium 4.1 05/05/2022 12:45 PM    Chloride 106 05/05/2022 12:45 PM    CO2 23 05/05/2022 12:45 PM    Anion gap 10 05/05/2022 12:45 PM    Glucose 88 05/05/2022 12:45 PM    BUN 12 05/05/2022 12:45 PM    Creatinine 0.83 05/05/2022 12:45 PM    BUN/Creatinine ratio 14 05/05/2022 12:45 PM    GFR est AA >60 05/05/2022 12:45 PM    GFR est non-AA >60 05/05/2022 12:45 PM    Calcium 8.9 05/05/2022 12:45 PM     No results found for: HBA1C, HBC7CCXB, ONU3DJAD  Lab Results   Component Value Date/Time    Cholesterol, total 179 05/17/2021 11:31 AM    HDL Cholesterol 87 05/17/2021 11:31 AM    LDL, calculated 75.8 05/17/2021 11:31 AM    VLDL, calculated 16.2 05/17/2021 11:31 AM    Triglyceride 81 05/17/2021 11:31 AM    CHOL/HDL Ratio 2.1 05/17/2021 11:31 AM     No results found for: Texie Sacks, VD3RIA    Lab Results   Component Value Date/Time    TSH 3.790 11/01/2021 12:01 PM

## 2022-08-26 DIAGNOSIS — J30.9 ALLERGIC SINUSITIS: ICD-10-CM

## 2022-08-29 RX ORDER — FLUTICASONE PROPIONATE 50 MCG
SPRAY, SUSPENSION (ML) NASAL
Qty: 3 EACH | Refills: 1 | Status: SHIPPED | OUTPATIENT
Start: 2022-08-29

## 2022-10-14 DIAGNOSIS — I10 ESSENTIAL HYPERTENSION: ICD-10-CM

## 2022-10-17 RX ORDER — LISINOPRIL 5 MG/1
TABLET ORAL
Qty: 90 TABLET | Refills: 1 | Status: SHIPPED | OUTPATIENT
Start: 2022-10-17

## 2022-12-27 ENCOUNTER — OFFICE VISIT (OUTPATIENT)
Dept: FAMILY MEDICINE CLINIC | Age: 62
End: 2022-12-27
Payer: COMMERCIAL

## 2022-12-27 VITALS
BODY MASS INDEX: 20.39 KG/M2 | TEMPERATURE: 98.6 F | HEIGHT: 61 IN | WEIGHT: 108 LBS | SYSTOLIC BLOOD PRESSURE: 115 MMHG | RESPIRATION RATE: 14 BRPM | OXYGEN SATURATION: 100 % | HEART RATE: 82 BPM | DIASTOLIC BLOOD PRESSURE: 75 MMHG

## 2022-12-27 DIAGNOSIS — R10.9 ABDOMINAL PAIN, UNSPECIFIED ABDOMINAL LOCATION: Primary | ICD-10-CM

## 2022-12-27 PROCEDURE — 99214 OFFICE O/P EST MOD 30 MIN: CPT | Performed by: STUDENT IN AN ORGANIZED HEALTH CARE EDUCATION/TRAINING PROGRAM

## 2022-12-27 PROCEDURE — 3074F SYST BP LT 130 MM HG: CPT | Performed by: STUDENT IN AN ORGANIZED HEALTH CARE EDUCATION/TRAINING PROGRAM

## 2022-12-27 PROCEDURE — 3078F DIAST BP <80 MM HG: CPT | Performed by: STUDENT IN AN ORGANIZED HEALTH CARE EDUCATION/TRAINING PROGRAM

## 2022-12-27 NOTE — PROGRESS NOTES
Room:     Identified pt with two pt identifiers(name and ). Reviewed record in preparation for visit and have obtained necessary documentation. All patient medications has been reviewed. Chief Complaint   Patient presents with    Abdominal Pain     [facility], had lab work done was told they couldn't find anything wrong          Health Maintenance Due   Topic    Shingles Vaccine (1 of 2)    Medicare Yearly Exam     Flu Vaccine (1)    Depression Monitoring      Flushot:10/17/22    Vitals:    22 1331   BP: 115/75   Pulse: 82   Resp: 14   SpO2: 100%   Weight: 108 lb (49 kg)   Height: 5' 1\" (1.549 m)   PainSc:   1   PainLoc: Abdomen   LMP: 2014       4. Have you been to the ER, urgent care clinic since your last visit? Hospitalized since your last visit? Yes, [facility], had labs told her they could not find anything wrong     5. Have you seen or consulted any other health care providers outside of the [address] [place] [address] since your last visit? Include any pap smears or colon screening. No    Patient is accompanied by self I have received verbal consent from Juan C Manuel to discuss any/all medical information while they are present in the room.

## 2022-12-27 NOTE — PROGRESS NOTES
Assessment/Plan:     Diagnoses and all orders for this visit:    1. Abdominal pain, unspecified abdominal location  -     LIPASE; Future  -     METABOLIC PANEL, BASIC; Future  -     CBC WITH AUTOMATED DIFF; Future  -     HEPATIC FUNCTION PANEL; Future  -     REFERRAL TO GASTROENTEROLOGY  -Abdominal pain noted in the epigastric and mid abdomen for the past month.  -Has been slowly improving but still intermittently present. Patient endorses gas type pains  -Check routine lab work today including a lipase  -Recommend referral to gastroenterology for further evaluation  -Can trial simethicone as needed to see if this provides improvement  -Continue on PPI  -Return precautions discussed. Follow-up and Dispositions    Return if symptoms worsen or fail to improve. Discussed expected course/resolution/complications of diagnosis in detail with patient. Medication risks/benefits/costs/interactions/alternatives discussed with patient. Pt expressed understanding with the diagnosis and plan    Subjective:      Donya Espinosa is a 60 y.o. female who presents for had concerns including Abdominal Pain ([facility]-3 [facility], had lab work done was told they couldn't find anything wrong ). Current Outpatient Medications   Medication Sig Dispense Refill    lisinopriL (PRINIVIL, ZESTRIL) 5 mg tablet TAKE 1 TABLET BY MOUTH EVERY DAY 90 Tablet 1    fluticasone propionate (FLONASE) 50 mcg/actuation nasal spray SPRAY 2 SPRAYS IN EACH NOSTRIL EVERY MORNING 3 Each 1    lansoprazole (PREVACID) 15 mg capsule TAKE 1 CAP BY MOUTH DAILY (BEFORE BREAKFAST). 90 Capsule 5    ondansetron (ZOFRAN ODT) 4 mg disintegrating tablet Take 4 mg by mouth as needed. cholecalciferol (VITAMIN D3) (2,000 UNITS /50 MCG) cap capsule Take  by mouth daily. ferrous sulfate 325 mg (65 mg iron) tablet Take  by mouth Daily (before breakfast).       levothyroxine (SYNTHROID) 75 mcg tablet 1 tab daily Monday through friday      valACYclovir (VALTREX) 500 mg tablet TAKE 1 TABLET BY MOUTH TWICE DAILY FOR 3 DAYS AS NEEDED 18 Tab 1       Allergies   Allergen Reactions    Iodinated Contrast Media Rash     Rash and trouble breathing  Rash and trouble breathing  Rash and trouble breathing    Other Plant, Animal, Environmental Sneezing     Pollen, mold and dust    Doxycycline Nausea and Vomiting    Sorbitol Diarrhea and Nausea and Vomiting     Past Medical History:   Diagnosis Date    Acute allergic rhinitis     Allergic rhinitis     Anxiety disorder     Anxiety Disorder/insomnia    Back pain     Chest pain, atypical     Chronic sinusitis     Depression     Elbow pain, right     Elevated serum gamma-glutamyl transferase level     elevated GGTP 79-no exposure to hepb/c immune to hep b    Equivocal stress echocardiogram     Stress echo feb 2015 neg, EF 55%    Fibroid, uterine     Genital herpes     GERD (gastroesophageal reflux disease)     GERD: s/p EGD 11-5-10: chronic inactive gastritis    High transaminase levels     History of colonoscopy     colonoscopy 9/09 one hypereplastic polyp recheck in 8-10 years.     HSV infection     Hydronephrosis 02/02/2016    right hydronephrosis transiently 2/2/16    Hypertension     Not on any medication    Menopause 2015    Normocytic anemia     Osteoarthritis     KNEES, ELBOWS, HANDS    Paresthesia of left upper extremity     Pelvic pain     Post-menopausal bleeding 10/2018    Thyroid disease     Tinea pedis     Visual field scotoma      Past Surgical History:   Procedure Laterality Date    HX CATARACT REMOVAL Bilateral 12/2021    HX COLONOSCOPY  09/18/2014    colonoscopy done at [facility] GI 9/18/14    HX ENDOSCOPY  2010    [facility] 11/1/2016:    HX GI  10/2019    COLONOSCOPY    HX HYSTERECTOMY  04/17/2019    PERFORMED AT     HX HYSTEROSCOPY  11/07/2018    With D&C - Ferd Gens    HX HYSTEROSCOPY WITH ENDOMETRIAL ABLATION  07/2012    HTA - [facility]    HX OTHER SURGICAL      laproscopy,btl    HX THYROIDECTOMY  10/25/2019    For benign multi lobular goiter     Family History   Problem Relation Age of Onset    Dementia Mother         Mother Anastacia Wilson.) - Has Family History of Other Medical Problems - dementia - Entered On: 2/2/2016    Hypertension Mother         Mother Anastacia Wilson.) - Has Family History of Hypertension - Entered On: 11/18/2014    Arthritis Mother         Mother Anastacia Wilson.) - Has Family History of Arthritis - Entered On: 11/19/2014    Lupus Sister     Thyroid Disease Sister     Colon Cancer Brother         Brother (full) - Has Family History of Colon Cancer - Entered On: 3/3/2015    Cancer Maternal Grandmother         MGM - Has Family History of Other Cancer - bladder - Entered On: 10/6/2015    Arthritis Maternal Grandmother     Thyroid Disease Sister         Sister (full) - Has Family History of Thyroid Disorder - Entered On: 12/23/2014    No Known Problems Brother     No Known Problems Son     Anesth Problems Neg Hx      Social History     Socioeconomic History    Marital status:      Spouse name: Not on file    Number of children: 1    Years of education: 16    Highest education level: Not on file   Occupational History    Occupation: no   Tobacco Use    Smoking status: Never    Smokeless tobacco: Never   Vaping Use    Vaping Use: Never used   Substance and Sexual Activity    Alcohol use: No    Drug use: No    Sexual activity: Yes     Partners: Male     Comment: In a relationship.  No concerns about STI   Other Topics Concern     Service Not Asked    Caffeine Concern Not Asked    Hobby Hazards Not Asked    Stress Concern Not Asked    Special Diet Not Asked    Exercise Yes    Seat Belt Yes    Blood Transfusions Not Asked    Occupational Exposure Not Asked    Sleep Concern Not Asked    Weight Concern Not Asked    Back Care Not Asked    Bike Helmet Not Asked    Self-Exams Not Asked    Drive While or After Drinking Not Asked    Ride with Intoxicated  Not Asked    Talk on Phone While Driving Not Asked    Text While Driving Not Asked    Frequently Outdoors Not Asked    Wear UV Protection Not Asked    Have Guns at Home Not Asked    Emotionally or Physically Abused Not Asked   Social History Narrative    Marital Status: in a relationship. She  her  in 2006. He was HIV positive but went to a  who prayed over him and told him that he was cured. After that he was convinced he did not have the infection anymore and refused to use safe sex. She does not know if he ever went back on treatment and does not know if he is still alive. 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 Children: 1 son, Sophia Phan    Occupation: Disability    Patient has never smoked. Alcohol Use - no    Smoke Dectector - Yes    Seat Belt Use - Yes     Social Determinants of Health     Financial Resource Strain: Not on file   Food Insecurity: Not on file   Transportation Needs: Not on file   Physical Activity: Not on file   Stress: Not on file   Social Connections: Not on file   Intimate Partner Violence: Not on file   Housing Stability: Not on file       Patient is a 61 yo female who presents for abdominal pain. She states that after Thanksgiving she developed abdominal pain noted in the epigastric and going down the middle of her abdomen. [facility] Patient states she had lab work completed that was relatively unremarkable except for hemoglobin of 11.5 noted. Patient states that her abdominal pain has improved some over the past month but is still present. She describes it as an intermittent pain occurring for about 1 hour/day. She denies any diarrhea, constipation, blood in the stool or any known food triggers. She states the pain is random and is not associated with eating.   She feels this is different than her acid reflux which she is currently taking a PPI for. She denies any nausea, vomiting. She has a history of a hysterectomy. She did have a colonoscopy in 2019 that per patient was normal however records are unavailable at this time. She does complain of some gas type pains that are associated with her abdominal pain. Since moving here she has not had a gastroenterologist but did have one previously. Of note a small amount [facility] and patient did follow-up with urology who per patient stated there were no abnormal findings. She is to follow-up with them in 1 year. ROS:   Review of Systems   Constitutional:  Negative for chills and fever. HENT:  Negative for congestion. Respiratory:  Negative for cough and shortness of breath. Cardiovascular:  Negative for chest pain and leg swelling. Gastrointestinal:  Positive for abdominal pain. Negative for blood in stool, constipation, diarrhea, heartburn, melena, nausea and vomiting. Genitourinary:  Negative for dysuria, flank pain, hematuria and urgency. Neurological:  Negative for dizziness, tingling, weakness and headaches. Objective:   Visit Vitals  /75 (BP 1 Location: Left upper arm, BP Patient Position: Sitting, BP Cuff Size: Adult)   Pulse 82   Temp 98.6 °F (37 °C) (Oral)   Resp 14   Ht 5' 1\" (1.549 m)   Wt 108 lb (49 kg)   LMP 12/07/2014 (Approximate) Comment: Menopause at age 53   SpO2 100%   BMI 20.41 kg/m²         Vitals and Nurse Documentation reviewed. Physical Exam  Constitutional:       General: She is not in acute distress. Appearance: Normal appearance. She is not toxic-appearing. HENT:      Head: Normocephalic and atraumatic. Eyes:      Conjunctiva/sclera: Conjunctivae normal.   Cardiovascular:      Rate and Rhythm: Normal rate and regular rhythm. Pulses: Normal pulses. Heart sounds: Normal heart sounds. No murmur heard. No gallop. Pulmonary:      Effort: Pulmonary effort is normal. No respiratory distress. Breath sounds: Normal breath sounds. No wheezing or rhonchi. Abdominal:      General: Bowel sounds are normal. There is no distension. Palpations: Abdomen is soft. Tenderness: There is abdominal tenderness (Mild tenderness to palpation in the mid abdomen most notable in the epigastric region). There is no right CVA tenderness, left CVA tenderness, guarding or rebound. Hernia: No hernia is present. Musculoskeletal:      Cervical back: Neck supple. Right lower leg: No edema. Left lower leg: No edema. Neurological:      Mental Status: She is alert and oriented to person, place, and time.       Gait: Gait normal.       Results for orders placed or performed in visit on 12/13/22   NOVEL CORONAVIRUS (COVID-19)   Result Value Ref Range    SARS-CoV-2, NIESHA Not Detected

## 2022-12-28 LAB
ALBUMIN SERPL-MCNC: 4 G/DL (ref 3.5–5)
ALBUMIN/GLOB SERPL: 1.2 {RATIO} (ref 1.1–2.2)
ALP SERPL-CCNC: 91 U/L (ref 45–117)
ALT SERPL-CCNC: 45 U/L (ref 12–78)
ANION GAP SERPL CALC-SCNC: 4 MMOL/L (ref 5–15)
AST SERPL-CCNC: 24 U/L (ref 15–37)
BASOPHILS # BLD: 0 K/UL (ref 0–0.1)
BASOPHILS NFR BLD: 0 % (ref 0–1)
BILIRUB DIRECT SERPL-MCNC: <0.1 MG/DL (ref 0–0.2)
BILIRUB SERPL-MCNC: 0.4 MG/DL (ref 0.2–1)
BUN SERPL-MCNC: 13 MG/DL (ref 6–20)
BUN/CREAT SERPL: 14 (ref 12–20)
CALCIUM SERPL-MCNC: 9.5 MG/DL (ref 8.5–10.1)
CHLORIDE SERPL-SCNC: 107 MMOL/L (ref 97–108)
CO2 SERPL-SCNC: 30 MMOL/L (ref 21–32)
CREAT SERPL-MCNC: 0.9 MG/DL (ref 0.55–1.02)
DIFFERENTIAL METHOD BLD: ABNORMAL
EOSINOPHIL # BLD: 0.1 K/UL (ref 0–0.4)
EOSINOPHIL NFR BLD: 1 % (ref 0–7)
ERYTHROCYTE [DISTWIDTH] IN BLOOD BY AUTOMATED COUNT: 13.9 % (ref 11.5–14.5)
GLOBULIN SER CALC-MCNC: 3.3 G/DL (ref 2–4)
GLUCOSE SERPL-MCNC: 86 MG/DL (ref 65–100)
HCT VFR BLD AUTO: 36.1 % (ref 35–47)
HGB BLD-MCNC: 11.6 G/DL (ref 11.5–16)
IMM GRANULOCYTES # BLD AUTO: 0 K/UL (ref 0–0.04)
IMM GRANULOCYTES NFR BLD AUTO: 0 % (ref 0–0.5)
LIPASE SERPL-CCNC: 94 U/L (ref 73–393)
LYMPHOCYTES # BLD: 1.7 K/UL (ref 0.8–3.5)
LYMPHOCYTES NFR BLD: 17 % (ref 12–49)
MCH RBC QN AUTO: 28.9 PG (ref 26–34)
MCHC RBC AUTO-ENTMCNC: 32.1 G/DL (ref 30–36.5)
MCV RBC AUTO: 89.8 FL (ref 80–99)
MONOCYTES # BLD: 0.5 K/UL (ref 0–1)
MONOCYTES NFR BLD: 5 % (ref 5–13)
NEUTS SEG # BLD: 7.6 K/UL (ref 1.8–8)
NEUTS SEG NFR BLD: 77 % (ref 32–75)
NRBC # BLD: 0 K/UL (ref 0–0.01)
NRBC BLD-RTO: 0 PER 100 WBC
PLATELET # BLD AUTO: 266 K/UL (ref 150–400)
PMV BLD AUTO: 11.3 FL (ref 8.9–12.9)
POTASSIUM SERPL-SCNC: 4 MMOL/L (ref 3.5–5.1)
PROT SERPL-MCNC: 7.3 G/DL (ref 6.4–8.2)
RBC # BLD AUTO: 4.02 M/UL (ref 3.8–5.2)
SODIUM SERPL-SCNC: 141 MMOL/L (ref 136–145)
WBC # BLD AUTO: 10 K/UL (ref 3.6–11)

## 2023-01-25 DIAGNOSIS — J30.9 ALLERGIC SINUSITIS: ICD-10-CM

## 2023-01-26 RX ORDER — FLUTICASONE PROPIONATE 50 MCG
SPRAY, SUSPENSION (ML) NASAL
Qty: 4 EACH | Refills: 1 | Status: SHIPPED | OUTPATIENT
Start: 2023-01-26

## 2023-02-28 ENCOUNTER — TRANSCRIBE ORDER (OUTPATIENT)
Dept: SCHEDULING | Age: 63
End: 2023-02-28

## 2023-02-28 ENCOUNTER — HOSPITAL ENCOUNTER (OUTPATIENT)
Dept: GENERAL RADIOLOGY | Age: 63
Discharge: HOME | End: 2023-02-28
Payer: COMMERCIAL

## 2023-02-28 ENCOUNTER — TRANSCRIBE ORDER (OUTPATIENT)
Dept: GENERAL RADIOLOGY | Age: 63
End: 2023-02-28

## 2023-02-28 DIAGNOSIS — R19.7 DIARRHEA, UNSPECIFIED TYPE: Primary | ICD-10-CM

## 2023-02-28 DIAGNOSIS — R22.1 LOCALIZED SWELLING, MASS AND LUMP, NECK: Primary | ICD-10-CM

## 2023-02-28 DIAGNOSIS — R19.4 CHANGE IN BOWEL HABITS: ICD-10-CM

## 2023-02-28 DIAGNOSIS — Z80.0 FAMILY HISTORY OF COLON CANCER: ICD-10-CM

## 2023-02-28 DIAGNOSIS — R14.0 BLOATING SYMPTOM: ICD-10-CM

## 2023-02-28 DIAGNOSIS — R19.7 DIARRHEA, UNSPECIFIED TYPE: ICD-10-CM

## 2023-02-28 PROCEDURE — 74018 RADEX ABDOMEN 1 VIEW: CPT

## 2023-03-08 ENCOUNTER — OFFICE VISIT (OUTPATIENT)
Dept: FAMILY MEDICINE CLINIC | Age: 63
End: 2023-03-08
Payer: MEDICARE

## 2023-03-08 VITALS
TEMPERATURE: 97.7 F | WEIGHT: 114 LBS | SYSTOLIC BLOOD PRESSURE: 133 MMHG | OXYGEN SATURATION: 100 % | HEIGHT: 61 IN | HEART RATE: 68 BPM | BODY MASS INDEX: 21.52 KG/M2 | RESPIRATION RATE: 16 BRPM | DIASTOLIC BLOOD PRESSURE: 80 MMHG

## 2023-03-08 DIAGNOSIS — R22.1 LOCALIZED SWELLING, MASS AND LUMP, NECK: Primary | ICD-10-CM

## 2023-03-08 DIAGNOSIS — J01.00 ACUTE NON-RECURRENT MAXILLARY SINUSITIS: ICD-10-CM

## 2023-03-08 PROCEDURE — G9899 SCRN MAM PERF RSLTS DOC: HCPCS | Performed by: STUDENT IN AN ORGANIZED HEALTH CARE EDUCATION/TRAINING PROGRAM

## 2023-03-08 PROCEDURE — G8428 CUR MEDS NOT DOCUMENT: HCPCS | Performed by: STUDENT IN AN ORGANIZED HEALTH CARE EDUCATION/TRAINING PROGRAM

## 2023-03-08 PROCEDURE — 99214 OFFICE O/P EST MOD 30 MIN: CPT | Performed by: STUDENT IN AN ORGANIZED HEALTH CARE EDUCATION/TRAINING PROGRAM

## 2023-03-08 PROCEDURE — G8420 CALC BMI NORM PARAMETERS: HCPCS | Performed by: STUDENT IN AN ORGANIZED HEALTH CARE EDUCATION/TRAINING PROGRAM

## 2023-03-08 PROCEDURE — 3078F DIAST BP <80 MM HG: CPT | Performed by: STUDENT IN AN ORGANIZED HEALTH CARE EDUCATION/TRAINING PROGRAM

## 2023-03-08 PROCEDURE — G9717 DOC PT DX DEP/BP F/U NT REQ: HCPCS | Performed by: STUDENT IN AN ORGANIZED HEALTH CARE EDUCATION/TRAINING PROGRAM

## 2023-03-08 PROCEDURE — 3017F COLORECTAL CA SCREEN DOC REV: CPT | Performed by: STUDENT IN AN ORGANIZED HEALTH CARE EDUCATION/TRAINING PROGRAM

## 2023-03-08 PROCEDURE — 3074F SYST BP LT 130 MM HG: CPT | Performed by: STUDENT IN AN ORGANIZED HEALTH CARE EDUCATION/TRAINING PROGRAM

## 2023-03-08 RX ORDER — AMOXICILLIN AND CLAVULANATE POTASSIUM 875; 125 MG/1; MG/1
1 TABLET, FILM COATED ORAL EVERY 12 HOURS
Qty: 14 TABLET | Refills: 0 | Status: SHIPPED | OUTPATIENT
Start: 2023-03-08 | End: 2023-03-15

## 2023-03-08 NOTE — PROGRESS NOTES
Assessment/Plan:     Diagnoses and all orders for this visit:    1. Localized swelling, mass and lump, neck  -     CT NECK SOFT TISSUE WO CONT; Future  -Area of localized swelling in the neck that has improved over the past couple weeks  -[facility] had previously ordered a CT of the neck with contrast however patient has an allergy to contrast.  -Therefore given persistent symptoms although improved will obtain a CT of the neck without contrast  -Patient has an appointment with the ENT in April for further evaluation    2. Acute non-recurrent maxillary sinusitis  -     amoxicillin-clavulanate (AUGMENTIN) 875-125 mg per tablet; Take 1 Tablet by mouth every twelve (12) hours for 7 days.  -Symptoms consistent with sinusitis noted for about 6 days.  -Recommend patient continue to monitor. If continued improvement and resolution over the next 1 to 3 days then no need for antibiotic however if symptoms do persist or if they should worsen recommend completing 7-day course of Augmentin  -Return precautions discussed      Follow-up and Dispositions    Return if symptoms worsen or fail to improve. Discussed expected course/resolution/complications of diagnosis in detail with patient. Medication risks/benefits/costs/interactions/alternatives discussed with patient. Pt expressed understanding with the diagnosis and plan    Subjective:      Jazlyn Oneill is a 60 y.o. female who presents for had concerns including Referral Request (CAT scan of throat ). Current Outpatient Medications   Medication Sig Dispense Refill    amoxicillin-clavulanate (AUGMENTIN) 875-125 mg per tablet Take 1 Tablet by mouth every twelve (12) hours for 7 days.  14 Tablet 0    fluticasone propionate (FLONASE) 50 mcg/actuation nasal spray SPRAY 2 SPRAYS IN EACH NOSTRIL EVERY MORNING 4 Each 1    lisinopriL (PRINIVIL, ZESTRIL) 5 mg tablet TAKE 1 TABLET BY MOUTH EVERY DAY 90 Tablet 1    lansoprazole (PREVACID) 15 mg capsule TAKE 1 CAP BY MOUTH DAILY (BEFORE BREAKFAST). 90 Capsule 5    ondansetron (ZOFRAN ODT) 4 mg disintegrating tablet Take 4 mg by mouth as needed. cholecalciferol (VITAMIN D3) (2,000 UNITS /50 MCG) cap capsule Take  by mouth daily. ferrous sulfate 325 mg (65 mg iron) tablet Take  by mouth Daily (before breakfast). levothyroxine (SYNTHROID) 75 mcg tablet 1 tab daily Monday through friday      valACYclovir (VALTREX) 500 mg tablet TAKE 1 TABLET BY MOUTH TWICE DAILY FOR 3 DAYS AS NEEDED 18 Tab 1       Allergies   Allergen Reactions    Iodinated Contrast Media Rash     Rash and trouble breathing  Rash and trouble breathing  Rash and trouble breathing    Other Plant, Animal, Environmental Sneezing     Pollen, mold and dust    Doxycycline Nausea and Vomiting    Sorbitol Diarrhea and Nausea and Vomiting     Past Medical History:   Diagnosis Date    Acute allergic rhinitis     Allergic rhinitis     Anxiety disorder     Anxiety Disorder/insomnia    Back pain     Chest pain, atypical     Chronic sinusitis     Depression     Elbow pain, right     Elevated serum gamma-glutamyl transferase level     elevated GGTP 79-no exposure to hepb/c immune to hep b    Equivocal stress echocardiogram     Stress echo feb 2015 neg, EF 55%    Fibroid, uterine     Genital herpes     GERD (gastroesophageal reflux disease)     GERD: s/p EGD 11-5-10: chronic inactive gastritis    High transaminase levels     History of colonoscopy     colonoscopy 9/09 one hypereplastic polyp recheck in 8-10 years.     HSV infection     Hydronephrosis 02/02/2016    right hydronephrosis transiently 2/2/16    Hypertension     Not on any medication    Menopause 2015    Normocytic anemia     Osteoarthritis     KNEES, ELBOWS, HANDS    Paresthesia of left upper extremity     Pelvic pain     Post-menopausal bleeding 10/2018    Thyroid disease     Tinea pedis     Visual field scotoma      Past Surgical History:   Procedure Laterality Date    HX CATARACT REMOVAL Bilateral 12/2021    HX COLONOSCOPY  09/18/2014    colonoscopy done at [facility] 9/18/14    HX ENDOSCOPY  2010    [facility] 11/1/2016:    HX GI  10/2019    COLONOSCOPY    HX HYSTERECTOMY  04/17/2019    PERFORMED AT     HX HYSTEROSCOPY  11/07/2018    With D&C - Rudi Brooks    HX HYSTEROSCOPY WITH ENDOMETRIAL ABLATION  07/2012    HTA - [facility]    HX OTHER SURGICAL      laproscopy,btl    HX THYROIDECTOMY  10/25/2019    For benign multi lobular goiter     Family History   Problem Relation Age of Onset    Dementia Mother         Mother Ephraim Martino.) - Has Family History of Other Medical Problems - dementia - Entered On: 2/2/2016    Hypertension Mother         Mother Ephraim Martino.) - Has Family History of Hypertension - Entered On: 11/18/2014    Arthritis Mother         Mother Ephraim Martino.) - Has Family History of Arthritis - Entered On: 11/19/2014    Lupus Sister     Thyroid Disease Sister     Colon Cancer Brother         Brother (full) - Has Family History of Colon Cancer - Entered On: 3/3/2015    Cancer Maternal Grandmother         MGM - Has Family History of Other Cancer - bladder - Entered On: 10/6/2015    Arthritis Maternal Grandmother     Thyroid Disease Sister         Sister (full) - Has Family History of Thyroid Disorder - Entered On: 12/23/2014    No Known Problems Brother     No Known Problems Son     Anesth Problems Neg Hx      Social History     Socioeconomic History    Marital status:      Spouse name: Not on file    Number of children: 1    Years of education: 16    Highest education level: Not on file   Occupational History    Occupation: no   Tobacco Use    Smoking status: Never    Smokeless tobacco: Never   Vaping Use    Vaping Use: Never used   Substance and Sexual Activity    Alcohol use: No    Drug use: No    Sexual activity: Yes     Partners: Male     Comment: In a relationship.  No concerns about STI   Other Topics Concern     Service Not Asked    Caffeine Concern Not Asked    Hobby Hazards Not Asked    Stress Concern Not Asked    Special Diet Not Asked    Exercise Yes    Seat Belt Yes    Blood Transfusions Not Asked    Occupational Exposure Not Asked    Sleep Concern Not Asked    Weight Concern Not Asked    Back Care Not Asked    Bike Helmet Not Asked    Self-Exams Not Asked    Drive While or After Drinking Not Asked    Ride with Intoxicated  Not Asked    Talk on Phone While Driving Not Asked    Text While Driving Not Asked    Frequently Outdoors Not Asked    Wear UV Protection Not Asked    Have Guns at Home Not Asked    Emotionally or Physically Abused Not Asked   Social History Narrative    Marital Status: in a relationship. She  her  in 2006. He was HIV positive but went to a  who prayed over him and told him that he was cured. After that he was convinced he did not have the infection anymore and refused to use safe sex. She does not know if he ever went back on treatment and does not know if he is still alive. 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 Children: 1 son, Shaila Livingston    Occupation: Disability    Patient has never smoked. Alcohol Use - no    Smoke Dectector - Yes    Seat Belt Use - Yes     Social Determinants of Health     Financial Resource Strain: Low Risk     Difficulty of Paying Living Expenses: Not hard at all   Food Insecurity: No Food Insecurity    Worried About Running Out of Food in the Last Year: Never true    Ran Out of Food in the Last Year: Never true   Transportation Needs: No Transportation Needs    Lack of Transportation (Medical): No    Lack of Transportation (Non-Medical):  No   Physical Activity: Not on file   Stress: Not on file   Social Connections: Not on file   Intimate Partner Violence: Not on file   Housing Stability: Low Risk     Unable to Pay for Housing in the Last Year: No    Number of Places Lived in the Last Year: 1    Unstable Housing in the Last Year: No       Patient is a 58-year-old female who presents to the office today for concerns of right-sided neck swelling. [facility] secondary to localized swelling of her right upper anterior neck region. A CT neck and soft tissue was ordered however with contrast dye. Patient states she has an allergy to contrast dye and would like it without. She states the swelling has gone down. She denies any difficulty swallowing, shortness of breath, pain, or worsening of symptoms. She states she does have a history of a thyroidectomy in the past.  Also of note patient states she has had about 6 days worth of persistent nasal congestion with sinus pain and pressure in the maxillary sinuses. She has started noting improvement recently however symptoms do persist.  She denies any fever, chills or any other acute concerns. ROS:   Review of Systems   Constitutional:  Negative for chills and fever. HENT:  Positive for congestion and sinus pain. Respiratory:  Negative for cough and shortness of breath. Cardiovascular:  Negative for chest pain and leg swelling. Gastrointestinal:  Negative for abdominal pain, nausea and vomiting. Neurological:  Negative for dizziness, tingling, weakness and headaches. Objective:   Visit Vitals  /80 (BP 1 Location: Left upper arm, BP Patient Position: Sitting, BP Cuff Size: Adult)   Pulse 68   Temp 97.7 °F (36.5 °C) (Temporal)   Resp 16   Ht 5' 1\" (1.549 m)   Wt 114 lb (51.7 kg)   LMP 12/07/2014 (Approximate) Comment: Menopause at age 53   SpO2 100%   BMI 21.54 kg/m²         Vitals and Nurse Documentation reviewed. Physical Exam  Constitutional:       General: She is not in acute distress. Appearance: Normal appearance. She is not toxic-appearing. HENT:      Head: Normocephalic and atraumatic.       Nose:      Right Sinus: Maxillary sinus tenderness present. No frontal sinus tenderness. Left Sinus: Maxillary sinus tenderness present. No frontal sinus tenderness. Mouth/Throat:      Mouth: Mucous membranes are moist.      Pharynx: Oropharynx is clear. No oropharyngeal exudate or posterior oropharyngeal erythema. Eyes:      Conjunctiva/sclera: Conjunctivae normal.      Pupils: Pupils are equal, round, and reactive to light. Neck:      Comments: Minimal swelling noted in right anterior upper neck region with small palpable cervical lymph node noted;  Nontender to palpation  Cardiovascular:      Rate and Rhythm: Normal rate and regular rhythm. Pulses: Normal pulses. Heart sounds: Normal heart sounds. No murmur heard. No gallop. Pulmonary:      Effort: Pulmonary effort is normal. No respiratory distress. Breath sounds: Normal breath sounds. No wheezing or rhonchi. Abdominal:      General: Bowel sounds are normal. There is no distension. Palpations: Abdomen is soft. Tenderness: There is no abdominal tenderness. There is no guarding. Musculoskeletal:      Cervical back: Normal range of motion and neck supple. No rigidity or tenderness. Right lower leg: No edema. Left lower leg: No edema. Neurological:      Mental Status: She is alert and oriented to person, place, and time.       Gait: Gait normal.

## 2023-03-08 NOTE — PROGRESS NOTES
Identified pt with two pt identifiers(name and ). Reviewed record in preparation for visit and have obtained necessary documentation. Chief Complaint   Patient presents with    Referral Request     CAT scan of throat         Health Maintenance Due   Topic    Shingles Vaccine (1 of 2)    Medicare Yearly Exam     Flu Vaccine (1)       Coordination of Care Questionnaire:  :   1) Have you been to an emergency room, urgent care, or hospitalized since your last visit? If yes, where when, and reason for visit? Yes, [facility] for tenderness in [facility]      2. Have seen or consulted any other health care provider since your last visit? If yes, where when, and reason for visit? [facility]       Patient is accompanied by self I have received verbal consent from [facility] to discuss any/all medical information while they are present in the room.

## 2023-03-09 ENCOUNTER — HOSPITAL ENCOUNTER (OUTPATIENT)
Dept: CT IMAGING | Age: 63
Discharge: HOME | End: 2023-03-09
Attending: PHYSICIAN ASSISTANT
Payer: MEDICARE

## 2023-03-09 DIAGNOSIS — R22.1 LOCALIZED SWELLING, MASS AND LUMP, NECK: ICD-10-CM

## 2023-03-09 PROCEDURE — 70490 CT SOFT TISSUE NECK W/O DYE: CPT

## 2023-05-10 DIAGNOSIS — I10 ESSENTIAL (PRIMARY) HYPERTENSION: ICD-10-CM

## 2023-05-10 RX ORDER — LISINOPRIL 5 MG/1
TABLET ORAL
Qty: 90 TABLET | Refills: 1 | Status: SHIPPED | OUTPATIENT
Start: 2023-05-10

## 2023-05-10 NOTE — TELEPHONE ENCOUNTER
PCP: Traci Harris MD    Last appt: [unfilled]  No future appointments.     Requested Prescriptions     Pending Prescriptions Disp Refills    lisinopril (PRINIVIL;ZESTRIL) 5 MG tablet [Pharmacy Med Name: LISINOPRIL 5 MG TABLET] 90 tablet 1     Sig: TAKE 1 TABLET BY MOUTH EVERY DAY       Prior labs and Blood pressures:  BP Readings from Last 3 Encounters:   03/08/23 133/80   12/27/22 115/75   05/05/22 136/69     Lab Results   Component Value Date/Time     12/27/2022 02:28 PM    K 4.0 12/27/2022 02:28 PM     12/27/2022 02:28 PM    CO2 30 12/27/2022 02:28 PM    BUN 13 12/27/2022 02:28 PM    GFRAA >60 05/05/2022 12:45 PM     No results found for: HBA1C, [id number]  Lab Results   Component Value Date/Time    CHOL 179 05/17/2021 11:31 AM    HDL 87 05/17/2021 11:31 AM     No results found for: VITD3, VD3RIA    Lab Results   Component Value Date/Time    TSH 3.790 11/01/2021 12:01 PM

## 2023-08-03 DIAGNOSIS — K21.00 GASTRO-ESOPHAGEAL REFLUX DISEASE WITH ESOPHAGITIS, WITHOUT BLEEDING: ICD-10-CM

## 2023-08-03 DIAGNOSIS — K29.70 GASTRITIS, UNSPECIFIED, WITHOUT BLEEDING: ICD-10-CM

## 2023-08-04 RX ORDER — MECOBALAMIN 5000 MCG
TABLET,DISINTEGRATING ORAL
Qty: 90 CAPSULE | Refills: 0 | Status: SHIPPED | OUTPATIENT
Start: 2023-08-04 | End: 2023-09-19 | Stop reason: SDUPTHER

## 2023-08-04 NOTE — TELEPHONE ENCOUNTER
Paul Giles MD  Medication filled on 5/31/2022 by Josue Melendez MD  Last seen on 3/8/2023 by America Smith MD  Patient needs appointment    Office Visit on 12/27/2022   Component Date Value Ref Range Status    Total Protein 12/27/2022 7.3  6.4 - 8.2 g/dL Final    Albumin 12/27/2022 4.0  3.5 - 5.0 g/dL Final    Globulin 12/27/2022 3.3  2.0 - 4.0 g/dL Final    Albumin/Globulin Ratio 12/27/2022 1.2  1.1 - 2.2   Final    Total Bilirubin 12/27/2022 0.4  0.2 - 1.0 MG/DL Final    Bilirubin, Direct 12/27/2022 <0.1  0.0 - 0.2 MG/DL Final    Alkaline Phosphatase 12/27/2022 91  45 - 117 U/L Final    AST 12/27/2022 24  15 - 37 U/L Final    ALT 12/27/2022 45  12 - 78 U/L Final    Lipase 12/27/2022 94  73 - 393 U/L Final    WBC 12/27/2022 10.0  3.6 - 11.0 K/uL Final    RBC 12/27/2022 4.02  3.80 - 5.20 M/uL Final    Hemoglobin 12/27/2022 11.6  11.5 - 16.0 g/dL Final    Hematocrit 12/27/2022 36.1  35.0 - 47.0 % Final    MCV 12/27/2022 89.8  80.0 - 99.0 FL Final    MCH 12/27/2022 28.9  26.0 - 34.0 PG Final    MCHC 12/27/2022 32.1  30.0 - 36.5 g/dL Final    RDW 12/27/2022 13.9  11.5 - 14.5 % Final    Platelets 16/31/3960 266  150 - 400 K/uL Final    MPV 12/27/2022 11.3  8.9 - 12.9 FL Final    Nucleated RBCs 12/27/2022 0.0  0  WBC Final    NRBC Absolute 12/27/2022 0.00  0.00 - 0.01 K/uL Final    Neutrophils % 12/27/2022 77 (H)  32 - 75 % Final    Lymphocytes % 12/27/2022 17  12 - 49 % Final    Monocytes % 12/27/2022 5  5 - 13 % Final    Eosinophils % 12/27/2022 1  0 - 7 % Final    Basophils % 12/27/2022 0  0 - 1 % Final    Immature Granulocytes 12/27/2022 0  0.0 - 0.5 % Final    Neutrophils Absolute 12/27/2022 7.6  1.8 - 8.0 K/UL Final    Lymphocytes Absolute 12/27/2022 1.7  0.8 - 3.5 K/UL Final    Monocytes Absolute 12/27/2022 0.5  0.0 - 1.0 K/UL Final    Eosinophils Absolute 12/27/2022 0.1  0.0 - 0.4 K/UL Final    Basophils Absolute 12/27/2022 0.0  0.0 - 0.1 K/UL Final    Granulocyte Absolute Count 12/27/2022 0.0  0.00 - 0.04 K/UL

## 2023-08-15 RX ORDER — FLUTICASONE PROPIONATE 50 MCG
2 SPRAY, SUSPENSION (ML) NASAL DAILY PRN
Qty: 1 EACH | Refills: 1 | Status: SHIPPED | OUTPATIENT
Start: 2023-08-15

## 2023-08-15 NOTE — TELEPHONE ENCOUNTER
PCP: Edwina Schuler MD    Last appt: [unfilled]  Future Appointments   Date Time Provider [address]   9/19/2023 10:40 AM Elke Mims MD [facility]       Requested Prescriptions      No prescriptions requested or ordered in this encounter       Prior labs and Blood pressures:  BP Readings from Last 3 Encounters:   03/08/23 133/80   12/27/22 115/75   05/05/22 136/69     Lab Results   Component Value Date/Time     12/27/2022 02:28 PM    K 4.0 12/27/2022 02:28 PM     12/27/2022 02:28 PM    CO2 30 12/27/2022 02:28 PM    BUN 13 12/27/2022 02:28 PM    GFRAA >60 05/05/2022 12:45 PM     No results found for: HBA1C, SKV2CWRB  Lab Results   Component Value Date/Time    CHOL 179 05/17/2021 11:31 AM    HDL 87 05/17/2021 11:31 AM     No results found for: VITD3, VD3RIA    Lab Results   Component Value Date/Time    TSH 3.790 11/01/2021 12:01 PM

## 2023-09-19 ENCOUNTER — OFFICE VISIT (OUTPATIENT)
Facility: CLINIC | Age: 63
End: 2023-09-19

## 2023-09-19 VITALS
SYSTOLIC BLOOD PRESSURE: 144 MMHG | HEART RATE: 70 BPM | WEIGHT: 122 LBS | TEMPERATURE: 98 F | OXYGEN SATURATION: 94 % | HEIGHT: 61 IN | DIASTOLIC BLOOD PRESSURE: 83 MMHG | BODY MASS INDEX: 23.03 KG/M2

## 2023-09-19 DIAGNOSIS — I10 ESSENTIAL (PRIMARY) HYPERTENSION: Primary | ICD-10-CM

## 2023-09-19 DIAGNOSIS — L74.9 SWEATING ABNORMALITY: ICD-10-CM

## 2023-09-19 DIAGNOSIS — Z13.220 LIPID SCREENING: ICD-10-CM

## 2023-09-19 DIAGNOSIS — Z11.59 ENCOUNTER FOR HEPATITIS C SCREENING TEST FOR LOW RISK PATIENT: ICD-10-CM

## 2023-09-19 DIAGNOSIS — K21.9 GASTROESOPHAGEAL REFLUX DISEASE, UNSPECIFIED WHETHER ESOPHAGITIS PRESENT: ICD-10-CM

## 2023-09-19 PROBLEM — Z12.4 SCREENING FOR MALIGNANT NEOPLASM OF CERVIX: Status: RESOLVED | Noted: 2018-06-07 | Resolved: 2023-09-19

## 2023-09-19 PROBLEM — R42 DIZZINESS: Status: RESOLVED | Noted: 2018-04-17 | Resolved: 2023-09-19

## 2023-09-19 PROBLEM — Z98.890 STATUS POST SURGERY: Status: RESOLVED | Noted: 2019-10-25 | Resolved: 2023-09-19

## 2023-09-19 PROBLEM — Z01.419 WOMEN'S ANNUAL ROUTINE GYNECOLOGICAL EXAMINATION: Status: RESOLVED | Noted: 2018-06-07 | Resolved: 2023-09-19

## 2023-09-19 PROBLEM — R19.7 DIARRHEA: Status: RESOLVED | Noted: 2018-04-17 | Resolved: 2023-09-19

## 2023-09-19 PROBLEM — R20.2 PARESTHESIA OF HAND, BILATERAL: Status: RESOLVED | Noted: 2018-04-17 | Resolved: 2023-09-19

## 2023-09-19 PROBLEM — R07.89 CHEST PAIN, ATYPICAL: Status: RESOLVED | Noted: 2018-04-17 | Resolved: 2023-09-19

## 2023-09-19 PROBLEM — R10.2 PELVIC PAIN: Status: RESOLVED | Noted: 2018-04-17 | Resolved: 2023-09-19

## 2023-09-19 PROBLEM — R11.2 NAUSEA AND VOMITING: Status: RESOLVED | Noted: 2018-04-17 | Resolved: 2023-09-19

## 2023-09-19 PROBLEM — K21.00 GASTRO-ESOPHAGEAL REFLUX DISEASE WITH ESOPHAGITIS, WITHOUT BLEEDING: Status: RESOLVED | Noted: 2018-04-17 | Resolved: 2023-09-19

## 2023-09-19 PROBLEM — N95.0 POSTMENOPAUSAL BLEEDING: Status: RESOLVED | Noted: 2018-06-07 | Resolved: 2023-09-19

## 2023-09-19 PROBLEM — K21.00 GASTRO-ESOPHAGEAL REFLUX DISEASE WITH ESOPHAGITIS, WITHOUT BLEEDING: Status: ACTIVE | Noted: 2018-04-17

## 2023-09-19 RX ORDER — LISINOPRIL 5 MG/1
5 TABLET ORAL DAILY
Qty: 90 TABLET | Refills: 1 | Status: SHIPPED | OUTPATIENT
Start: 2023-09-19

## 2023-09-19 RX ORDER — GLUCOSAMINE HCL 500 MG
1 TABLET ORAL DAILY
COMMUNITY

## 2023-09-19 RX ORDER — MECOBALAMIN 5000 MCG
15 TABLET,DISINTEGRATING ORAL DAILY
Qty: 90 CAPSULE | Refills: 1 | Status: SHIPPED | OUTPATIENT
Start: 2023-09-19

## 2023-09-19 ASSESSMENT — ENCOUNTER SYMPTOMS
COUGH: 0
SHORTNESS OF BREATH: 0
RHINORRHEA: 0
NAUSEA: 0
VOMITING: 0
ABDOMINAL PAIN: 0

## 2023-09-19 ASSESSMENT — PATIENT HEALTH QUESTIONNAIRE - PHQ9
SUM OF ALL RESPONSES TO PHQ QUESTIONS 1-9: 0
2. FEELING DOWN, DEPRESSED OR HOPELESS: 0
SUM OF ALL RESPONSES TO PHQ9 QUESTIONS 1 & 2: 0
SUM OF ALL RESPONSES TO PHQ QUESTIONS 1-9: 0
SUM OF ALL RESPONSES TO PHQ QUESTIONS 1-9: 0
1. LITTLE INTEREST OR PLEASURE IN DOING THINGS: 0
SUM OF ALL RESPONSES TO PHQ QUESTIONS 1-9: 0

## 2023-09-20 LAB
ALBUMIN SERPL-MCNC: 4 G/DL (ref 3.5–5)
ALBUMIN/GLOB SERPL: 1.2 (ref 1.1–2.2)
ALP SERPL-CCNC: 81 U/L (ref 45–117)
ALT SERPL-CCNC: 54 U/L (ref 12–78)
ANION GAP SERPL CALC-SCNC: 5 MMOL/L (ref 5–15)
AST SERPL-CCNC: 26 U/L (ref 15–37)
BILIRUB SERPL-MCNC: 0.5 MG/DL (ref 0.2–1)
BUN SERPL-MCNC: 15 MG/DL (ref 6–20)
BUN/CREAT SERPL: 18 (ref 12–20)
CALCIUM SERPL-MCNC: 9.4 MG/DL (ref 8.5–10.1)
CHLORIDE SERPL-SCNC: 107 MMOL/L (ref 97–108)
CHOLEST SERPL-MCNC: 199 MG/DL
CO2 SERPL-SCNC: 28 MMOL/L (ref 21–32)
CREAT SERPL-MCNC: 0.85 MG/DL (ref 0.55–1.02)
CREAT UR-MCNC: 108 MG/DL
ERYTHROCYTE [DISTWIDTH] IN BLOOD BY AUTOMATED COUNT: 13.9 % (ref 11.5–14.5)
GLOBULIN SER CALC-MCNC: 3.4 G/DL (ref 2–4)
GLUCOSE SERPL-MCNC: 96 MG/DL (ref 65–100)
HCT VFR BLD AUTO: 37.8 % (ref 35–47)
HCV AB SER IA-ACNC: 0.14 INDEX
HCV AB SERPL QL IA: NONREACTIVE
HDLC SERPL-MCNC: 95 MG/DL
HDLC SERPL: 2.1 (ref 0–5)
HGB BLD-MCNC: 12.1 G/DL (ref 11.5–16)
LDLC SERPL CALC-MCNC: 89.2 MG/DL (ref 0–100)
MCH RBC QN AUTO: 29.2 PG (ref 26–34)
MCHC RBC AUTO-ENTMCNC: 32 G/DL (ref 30–36.5)
MCV RBC AUTO: 91.3 FL (ref 80–99)
MICROALBUMIN UR-MCNC: 0.57 MG/DL
MICROALBUMIN/CREAT UR-RTO: 5 MG/G (ref 0–30)
NRBC # BLD: 0 K/UL (ref 0–0.01)
NRBC BLD-RTO: 0 PER 100 WBC
PLATELET # BLD AUTO: 255 K/UL (ref 150–400)
PMV BLD AUTO: 11.3 FL (ref 8.9–12.9)
POTASSIUM SERPL-SCNC: 4.3 MMOL/L (ref 3.5–5.1)
PROT SERPL-MCNC: 7.4 G/DL (ref 6.4–8.2)
RBC # BLD AUTO: 4.14 M/UL (ref 3.8–5.2)
SODIUM SERPL-SCNC: 140 MMOL/L (ref 136–145)
TRIGL SERPL-MCNC: 74 MG/DL
VLDLC SERPL CALC-MCNC: 14.8 MG/DL
WBC # BLD AUTO: 5.4 K/UL (ref 3.6–11)

## 2023-10-06 PROBLEM — M85.89 OSTEOPENIA OF MULTIPLE SITES: Status: ACTIVE | Noted: 2023-10-06

## 2023-12-26 NOTE — TELEPHONE ENCOUNTER
PCP: Mery Bean MD    Last appt: [unfilled]  Future Appointments   Date Time Provider Department Center   3/19/2024 10:00 AM Jagruti Haines MD [facility]       Requested Prescriptions     Pending Prescriptions Disp Refills    fluticasone (FLONASE) 50 MCG/ACT nasal spray [Pharmacy Med Name: FLUTICASONE PROP 50 MCG SPRAY]  1     Sig: SPRAY 2 SPRAYS BY NASAL ROUTE DAILY AS NEEDED FOR RHINITIS.       Prior labs and Blood pressures:  BP Readings from Last 3 Encounters:   12/19/23 115/76   09/19/23 (!) 144/83   03/08/23 133/80     Lab Results   Component Value Date/Time     09/19/2023 11:47 AM    K 4.3 09/19/2023 11:47 AM     09/19/2023 11:47 AM    CO2 28 09/19/2023 11:47 AM    BUN 15 09/19/2023 11:47 AM    GFRAA >60 05/05/2022 12:45 PM     No results found for: \"HBA1C\", \"[id number]\"  Lab Results   Component Value Date/Time    CHOL 199 09/19/2023 11:47 AM    HDL 95 09/19/2023 11:47 AM     No results found for: \"VITD3\", \"VD3RIA\"    Lab Results   Component Value Date/Time    TSH 3.790 11/01/2021 12:01 PM

## 2024-01-01 RX ORDER — FLUTICASONE PROPIONATE 50 MCG
SPRAY, SUSPENSION (ML) NASAL
Qty: 1 EACH | Refills: 3 | Status: SHIPPED | OUTPATIENT
Start: 2024-01-01

## 2024-02-18 DIAGNOSIS — I10 ESSENTIAL (PRIMARY) HYPERTENSION: ICD-10-CM

## 2024-02-18 DIAGNOSIS — K21.9 GASTROESOPHAGEAL REFLUX DISEASE, UNSPECIFIED WHETHER ESOPHAGITIS PRESENT: ICD-10-CM

## 2024-02-19 NOTE — TELEPHONE ENCOUNTER
Chief Complaint   Patient presents with    Medication Refill       Requested Prescriptions     Pending Prescriptions Disp Refills    lisinopril (PRINIVIL;ZESTRIL) 5 MG tablet [Pharmacy Med Name: LISINOPRIL 5 MG TABLET] 90 tablet 1     Sig: TAKE 1 TABLET BY MOUTH EVERY DAY    lansoprazole (PREVACID) 15 MG delayed release capsule [Pharmacy Med Name: LANSOPRAZOLE DR 15 MG CAPSULE] 90 capsule 1     Sig: TAKE 1 CAPSULE BY MOUTH EVERY DAY       Allergies:  Allergies   Allergen Reactions    Iodinated Contrast Media Rash     Rash and trouble breathing  Rash and trouble breathing  Rash and trouble breathing    Doxycycline Nausea And Vomiting    Sorbitol Diarrhea and Nausea And Vomiting       Last visit with clinic:  12/19/2023   Next visit with clinic: 3/19/2024     Last visit with this provider: 12/19/2023   Next Visit with this provider: 3/19/2024    Signed by Stella Van MA CMA  02/19/24  9:17 AM

## 2024-02-20 RX ORDER — LISINOPRIL 5 MG/1
5 TABLET ORAL DAILY
Qty: 90 TABLET | Refills: 1 | Status: SHIPPED | OUTPATIENT
Start: 2024-02-20

## 2024-02-20 RX ORDER — MECOBALAMIN 5000 MCG
TABLET,DISINTEGRATING ORAL DAILY
Qty: 90 CAPSULE | Refills: 1 | Status: SHIPPED | OUTPATIENT
Start: 2024-02-20

## 2024-03-19 ENCOUNTER — OFFICE VISIT (OUTPATIENT)
Facility: CLINIC | Age: 64
End: 2024-03-19
Payer: MEDICARE

## 2024-03-19 VITALS
DIASTOLIC BLOOD PRESSURE: 74 MMHG | RESPIRATION RATE: 16 BRPM | OXYGEN SATURATION: 100 % | BODY MASS INDEX: 23.86 KG/M2 | TEMPERATURE: 97.9 F | HEIGHT: 61 IN | WEIGHT: 126.4 LBS | HEART RATE: 82 BPM | SYSTOLIC BLOOD PRESSURE: 142 MMHG

## 2024-03-19 DIAGNOSIS — I10 ESSENTIAL (PRIMARY) HYPERTENSION: ICD-10-CM

## 2024-03-19 DIAGNOSIS — Z13.1 DIABETES MELLITUS SCREENING: ICD-10-CM

## 2024-03-19 DIAGNOSIS — Z00.00 MEDICARE ANNUAL WELLNESS VISIT, SUBSEQUENT: Primary | ICD-10-CM

## 2024-03-19 PROBLEM — M25.562 KNEE PAIN, LEFT: Status: RESOLVED | Noted: 2018-04-17 | Resolved: 2024-03-19

## 2024-03-19 PROBLEM — M25.521 PAIN IN JOINT OF RIGHT ELBOW: Status: RESOLVED | Noted: 2018-04-17 | Resolved: 2024-03-19

## 2024-03-19 PROBLEM — M25.561 KNEE PAIN, BILATERAL: Status: RESOLVED | Noted: 2018-04-17 | Resolved: 2024-03-19

## 2024-03-19 PROBLEM — M25.561 KNEE PAIN, RIGHT: Status: RESOLVED | Noted: 2018-04-17 | Resolved: 2024-03-19

## 2024-03-19 PROBLEM — M25.522 PAIN IN JOINT OF LEFT ELBOW: Status: RESOLVED | Noted: 2018-04-17 | Resolved: 2024-03-19

## 2024-03-19 PROBLEM — R31.29 OTHER MICROSCOPIC HEMATURIA: Status: RESOLVED | Noted: 2018-04-17 | Resolved: 2024-03-19

## 2024-03-19 PROBLEM — M25.521 ELBOW PAIN, RIGHT: Status: RESOLVED | Noted: 2018-04-17 | Resolved: 2024-03-19

## 2024-03-19 PROBLEM — M25.562 KNEE PAIN, BILATERAL: Status: RESOLVED | Noted: 2018-04-17 | Resolved: 2024-03-19

## 2024-03-19 LAB
ANION GAP SERPL CALC-SCNC: 6 MMOL/L (ref 5–15)
BUN SERPL-MCNC: 15 MG/DL (ref 6–20)
BUN/CREAT SERPL: 16 (ref 12–20)
CALCIUM SERPL-MCNC: 9.5 MG/DL (ref 8.5–10.1)
CHLORIDE SERPL-SCNC: 106 MMOL/L (ref 97–108)
CO2 SERPL-SCNC: 30 MMOL/L (ref 21–32)
CREAT SERPL-MCNC: 0.91 MG/DL (ref 0.55–1.02)
EST. AVERAGE GLUCOSE BLD GHB EST-MCNC: 111 MG/DL
GLUCOSE SERPL-MCNC: 85 MG/DL (ref 65–100)
HBA1C MFR BLD: 5.5 % (ref 4–5.6)
POTASSIUM SERPL-SCNC: 4.4 MMOL/L (ref 3.5–5.1)
SODIUM SERPL-SCNC: 142 MMOL/L (ref 136–145)

## 2024-03-19 PROCEDURE — 3078F DIAST BP <80 MM HG: CPT | Performed by: STUDENT IN AN ORGANIZED HEALTH CARE EDUCATION/TRAINING PROGRAM

## 2024-03-19 PROCEDURE — 3077F SYST BP >= 140 MM HG: CPT | Performed by: STUDENT IN AN ORGANIZED HEALTH CARE EDUCATION/TRAINING PROGRAM

## 2024-03-19 PROCEDURE — G0439 PPPS, SUBSEQ VISIT: HCPCS | Performed by: STUDENT IN AN ORGANIZED HEALTH CARE EDUCATION/TRAINING PROGRAM

## 2024-03-19 PROCEDURE — 99213 OFFICE O/P EST LOW 20 MIN: CPT | Performed by: STUDENT IN AN ORGANIZED HEALTH CARE EDUCATION/TRAINING PROGRAM

## 2024-03-19 RX ORDER — MAGNESIUM GLUCONATE 27 MG(500)
500 TABLET ORAL 2 TIMES DAILY
COMMUNITY

## 2024-03-19 RX ORDER — CALCIUM CARBONATE 500 MG/1
1 TABLET, CHEWABLE ORAL DAILY
COMMUNITY

## 2024-03-19 SDOH — ECONOMIC STABILITY: INCOME INSECURITY: HOW HARD IS IT FOR YOU TO PAY FOR THE VERY BASICS LIKE FOOD, HOUSING, MEDICAL CARE, AND HEATING?: NOT HARD AT ALL

## 2024-03-19 SDOH — ECONOMIC STABILITY: FOOD INSECURITY: WITHIN THE PAST 12 MONTHS, THE FOOD YOU BOUGHT JUST DIDN'T LAST AND YOU DIDN'T HAVE MONEY TO GET MORE.: NEVER TRUE

## 2024-03-19 SDOH — ECONOMIC STABILITY: HOUSING INSECURITY
IN THE LAST 12 MONTHS, WAS THERE A TIME WHEN YOU DID NOT HAVE A STEADY PLACE TO SLEEP OR SLEPT IN A SHELTER (INCLUDING NOW)?: NO

## 2024-03-19 SDOH — ECONOMIC STABILITY: FOOD INSECURITY: WITHIN THE PAST 12 MONTHS, YOU WORRIED THAT YOUR FOOD WOULD RUN OUT BEFORE YOU GOT MONEY TO BUY MORE.: NEVER TRUE

## 2024-03-19 ASSESSMENT — PATIENT HEALTH QUESTIONNAIRE - PHQ9
SUM OF ALL RESPONSES TO PHQ QUESTIONS 1-9: 0
9. THOUGHTS THAT YOU WOULD BE BETTER OFF DEAD, OR OF HURTING YOURSELF: NOT AT ALL
SUM OF ALL RESPONSES TO PHQ QUESTIONS 1-9: 0
SUM OF ALL RESPONSES TO PHQ QUESTIONS 1-9: 0
10. IF YOU CHECKED OFF ANY PROBLEMS, HOW DIFFICULT HAVE THESE PROBLEMS MADE IT FOR YOU TO DO YOUR WORK, TAKE CARE OF THINGS AT HOME, OR GET ALONG WITH OTHER PEOPLE: NOT DIFFICULT AT ALL
2. FEELING DOWN, DEPRESSED OR HOPELESS: NOT AT ALL
1. LITTLE INTEREST OR PLEASURE IN DOING THINGS: NOT AT ALL
SUM OF ALL RESPONSES TO PHQ9 QUESTIONS 1 & 2: 0
6. FEELING BAD ABOUT YOURSELF - OR THAT YOU ARE A FAILURE OR HAVE LET YOURSELF OR YOUR FAMILY DOWN: NOT AT ALL
7. TROUBLE CONCENTRATING ON THINGS, SUCH AS READING THE NEWSPAPER OR WATCHING TELEVISION: NOT AT ALL
8. MOVING OR SPEAKING SO SLOWLY THAT OTHER PEOPLE COULD HAVE NOTICED. OR THE OPPOSITE, BEING SO FIGETY OR RESTLESS THAT YOU HAVE BEEN MOVING AROUND A LOT MORE THAN USUAL: NOT AT ALL
3. TROUBLE FALLING OR STAYING ASLEEP: NOT AT ALL
SUM OF ALL RESPONSES TO PHQ QUESTIONS 1-9: 0
5. POOR APPETITE OR OVEREATING: NOT AT ALL
4. FEELING TIRED OR HAVING LITTLE ENERGY: NOT AT ALL

## 2024-03-19 ASSESSMENT — LIFESTYLE VARIABLES
HOW OFTEN DO YOU HAVE A DRINK CONTAINING ALCOHOL: NEVER
HOW MANY STANDARD DRINKS CONTAINING ALCOHOL DO YOU HAVE ON A TYPICAL DAY: PATIENT DOES NOT DRINK

## 2024-03-19 NOTE — PROGRESS NOTES
1. Have you been to the ER, urgent care clinic since your last visit?  Hospitalized since your last visit?No    2. Have you seen or consulted any other health care providers outside of the [facility] since your last visit?  Include any pap smears or colon screening. No  Chief Complaint   Patient presents with    Medicare AWV     Health Maintenance Due   Topic Date Due    HIV screen  Never done    Shingles vaccine (1 of 2) Never done    Respiratory Syncytial Virus (RSV) Pregnant or age 60 yrs+ (1 - 1-dose 60+ series) Never done    DTaP/Tdap/Td vaccine (3 - Td or Tdap) 06/13/2023    Flu vaccine (1) 08/01/2023    COVID-19 Vaccine (6 - 2023-24 season) 09/01/2023    Annual Wellness Visit (Medicare Advantage)  Never done     PHQ-9 Total Score: 0 (3/19/2024 10:06 AM)  Thoughts that you would be better off dead, or of hurting yourself in some way: 0 (3/19/2024 10:06 AM)        3/19/2024    10:00 AM   AMB Abuse Screening   Do you ever feel afraid of your partner? N   Are you in a relationship with someone who physically or mentally threatens you? N   Is it safe for you to go home? Y         3/19/2024    10:05 AM   Amb Fall Risk Assessment and TUG Test   Do you feel unsteady or are you worried about falling?  no   2 or more falls in past year? no   Fall with injury in past year? no     Vitals:    03/19/24 1005   BP: (!) 144/89   Pulse: 82   Resp: 16   Temp: 97.9 °F (36.6 °C)   SpO2: 100%         
Flowsheets. The following problems were reviewed today and where indicated follow up appointments were made and/or referrals ordered.    No Positive Risk Factors identified today.    Review of Systems   Constitutional:  Negative for chills and fever.   HENT:  Negative for rhinorrhea     Respiratory:  Negative for cough and shortness of breath.    Cardiovascular:  Negative for chest pain and leg swelling.   Gastrointestinal:  Negative for abdominal pain, nausea and vomiting.   Neurological:  Negative for dizziness, weakness, numbness and headaches.                               Objective   Vitals:    03/19/24 1005 03/19/24 1046   BP: (!) 144/89 (!) 142/74   Site: Left Upper Arm Left Upper Arm   Position: Sitting    Cuff Size: Large Adult    Pulse: 82    Resp: 16    Temp: 97.9 °F (36.6 °C)    TempSrc: Skin    SpO2: 100%    Weight: 57.3 kg (126 lb 6.4 oz)    Height: 1.549 m (5' 1\")       Body mass index is 23.88 kg/m².       Physical Exam  Constitutional:       General: She is not in acute distress.     Appearance: Normal appearance. She is not ill-appearing.   HENT:      Head: Normocephalic and atraumatic.   Eyes:      Conjunctiva/sclera: Conjunctivae normal.   Cardiovascular:      Rate and Rhythm: Normal rate and regular rhythm.      Heart sounds: Normal heart sounds. No murmur heard.     No friction rub. No gallop.   Pulmonary:      Effort: Pulmonary effort is normal. No respiratory distress.      Breath sounds: Normal breath sounds. No wheezing, rhonchi or rales.   Abdominal:      General: Bowel sounds are normal. There is no distension.      Palpations: Abdomen is soft.      Tenderness: There is no abdominal tenderness. There is no guarding or rebound.   Musculoskeletal:      Cervical back: Neck supple.      Right lower leg: No edema.      Left lower leg: No edema.   Neurological:      Mental Status: She is alert and oriented to person, place, and time.      Gait: Gait normal.       Allergies   Allergen Reactions

## 2024-04-01 RX ORDER — FLUTICASONE PROPIONATE 50 MCG
SPRAY, SUSPENSION (ML) NASAL
Qty: 1 EACH | Refills: 3 | Status: SHIPPED | OUTPATIENT
Start: 2024-04-01

## 2024-04-01 NOTE — TELEPHONE ENCOUNTER
Chief Complaint   Patient presents with    Medication Refill       Requested Prescriptions     Pending Prescriptions Disp Refills    fluticasone (FLONASE) 50 MCG/ACT nasal spray [Pharmacy Med Name: FLUTICASONE PROP 50 MCG SPRAY]  1     Sig: SPRAY 2 SPRAYS BY NASAL ROUTE DAILY AS NEEDED FOR RHINITIS.       Allergies:  Allergies   Allergen Reactions    Iodinated Contrast Media Rash     Rash and trouble breathing  Rash and trouble breathing  Rash and trouble breathing    Doxycycline Nausea And Vomiting    Sorbitol Diarrhea and Nausea And Vomiting       Last visit with clinic:  3/19/2024   Next visit with clinic: 9/19/2024     Last visit with this provider: 3/19/2024   Next Visit with this provider: 9/19/2024    Signed by Stella Van MA [facility]  04/01/24  8:44 AM

## 2024-05-15 RX ORDER — FLUTICASONE PROPIONATE 50 MCG
SPRAY, SUSPENSION (ML) NASAL
Qty: 1 EACH | Refills: 3 | OUTPATIENT
Start: 2024-05-15

## 2024-06-06 RX ORDER — FLUTICASONE PROPIONATE 50 MCG
SPRAY, SUSPENSION (ML) NASAL
Qty: 1 EACH | Refills: 2 | Status: SHIPPED | OUTPATIENT
Start: 2024-06-06

## 2024-06-06 NOTE — TELEPHONE ENCOUNTER
PCP: Jagruti Haines MD    Last appt: [unfilled]  Future Appointments   Date Time Provider Department Center   9/19/2024 10:00 AM Jagruti Haines MD [facility]       Requested Prescriptions     Pending Prescriptions Disp Refills    fluticasone (FLONASE) 50 MCG/ACT nasal spray [Pharmacy Med Name: FLUTICASONE PROP 50 MCG SPRAY]  1     Sig: SPRAY 2 SPRAYS BY NASAL ROUTE DAILY AS NEEDED FOR RHINITIS.       Prior labs and Blood pressures:  BP Readings from Last 3 Encounters:   03/19/24 (!) 142/74   12/19/23 115/76   09/19/23 (!) 144/83     Lab Results   Component Value Date/Time     03/19/2024 11:04 AM    K 4.4 03/19/2024 11:04 AM     03/19/2024 11:04 AM    CO2 30 03/19/2024 11:04 AM    BUN 15 03/19/2024 11:04 AM    GFRAA >60 05/05/2022 12:45 PM     No results found for: \"HBA1C\", \"[id number]\"  Lab Results   Component Value Date/Time    CHOL 199 09/19/2023 11:47 AM    HDL 95 09/19/2023 11:47 AM    LDL 89.2 09/19/2023 11:47 AM    VLDL 14.8 09/19/2023 11:47 AM     No results found for: \"VITD3\"    Lab Results   Component Value Date/Time    TSH 3.790 11/01/2021 12:01 PM

## 2024-07-22 RX ORDER — FLUTICASONE PROPIONATE 50 MCG
SPRAY, SUSPENSION (ML) NASAL
OUTPATIENT
Start: 2024-07-22

## 2024-09-19 ENCOUNTER — OFFICE VISIT (OUTPATIENT)
Facility: CLINIC | Age: 64
End: 2024-09-19
Payer: MEDICARE

## 2024-09-19 VITALS
DIASTOLIC BLOOD PRESSURE: 74 MMHG | RESPIRATION RATE: 20 BRPM | TEMPERATURE: 97.3 F | WEIGHT: 130 LBS | HEART RATE: 77 BPM | BODY MASS INDEX: 24.55 KG/M2 | OXYGEN SATURATION: 99 % | HEIGHT: 61 IN | SYSTOLIC BLOOD PRESSURE: 122 MMHG

## 2024-09-19 DIAGNOSIS — Z01.84 IMMUNITY STATUS TESTING: ICD-10-CM

## 2024-09-19 DIAGNOSIS — I10 ESSENTIAL (PRIMARY) HYPERTENSION: Primary | ICD-10-CM

## 2024-09-19 DIAGNOSIS — M25.561 ACUTE PAIN OF RIGHT KNEE: ICD-10-CM

## 2024-09-19 DIAGNOSIS — Z13.220 LIPID SCREENING: ICD-10-CM

## 2024-09-19 DIAGNOSIS — K21.9 GASTROESOPHAGEAL REFLUX DISEASE, UNSPECIFIED WHETHER ESOPHAGITIS PRESENT: ICD-10-CM
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
## 2024-09-19 PROCEDURE — 3078F DIAST BP <80 MM HG: CPT | Performed by: STUDENT IN AN ORGANIZED HEALTH CARE EDUCATION/TRAINING PROGRAM

## 2024-09-19 PROCEDURE — 3074F SYST BP LT 130 MM HG: CPT | Performed by: STUDENT IN AN ORGANIZED HEALTH CARE EDUCATION/TRAINING PROGRAM

## 2024-09-19 PROCEDURE — 99214 OFFICE O/P EST MOD 30 MIN: CPT | Performed by: STUDENT IN AN ORGANIZED HEALTH CARE EDUCATION/TRAINING PROGRAM

## 2024-09-19 PROCEDURE — 1123F ACP DISCUSS/DSCN MKR DOCD: CPT | Performed by: STUDENT IN AN ORGANIZED HEALTH CARE EDUCATION/TRAINING PROGRAM

## 2024-09-19 ASSESSMENT — ENCOUNTER SYMPTOMS
ABDOMINAL PAIN: 0
COUGH: 0
NAUSEA: 0
RHINORRHEA: 0
VOMITING: 0
SHORTNESS OF BREATH: 0

## 2024-09-20 LAB
ALBUMIN SERPL-MCNC: 4 G/DL (ref 3.5–5)
ALBUMIN/GLOB SERPL: 1.3 (ref 1.1–2.2)
ALP SERPL-CCNC: 96 U/L (ref 45–117)
ALT SERPL-CCNC: 25 U/L (ref 12–78)
ANION GAP SERPL CALC-SCNC: 1 MMOL/L (ref 2–12)
AST SERPL-CCNC: 14 U/L (ref 15–37)
BILIRUB SERPL-MCNC: 0.4 MG/DL (ref 0.2–1)
BUN SERPL-MCNC: 14 MG/DL (ref 6–20)
BUN/CREAT SERPL: 15 (ref 12–20)
CALCIUM SERPL-MCNC: 9.9 MG/DL (ref 8.5–10.1)
CHLORIDE SERPL-SCNC: 108 MMOL/L (ref 97–108)
CHOLEST SERPL-MCNC: 175 MG/DL
CO2 SERPL-SCNC: 29 MMOL/L (ref 21–32)
CREAT SERPL-MCNC: 0.91 MG/DL (ref 0.55–1.02)
CREAT UR-MCNC: 143 MG/DL
ERYTHROCYTE [DISTWIDTH] IN BLOOD BY AUTOMATED COUNT: 13.9 % (ref 11.5–14.5)
GLOBULIN SER CALC-MCNC: 3.2 G/DL (ref 2–4)
GLUCOSE SERPL-MCNC: 93 MG/DL (ref 65–100)
HBV CORE AB SERPL QL IA: NEGATIVE
HBV CORE IGM SERPL QL IA: NEGATIVE
HBV SURFACE AB SER QL: REACTIVE
HBV SURFACE AB SER-ACNC: 106.52 MIU/ML
HBV SURFACE AG SER QL: <0.1 INDEX
HBV SURFACE AG SER QL: NEGATIVE
HCT VFR BLD AUTO: 36.4 % (ref 35–47)
HDLC SERPL-MCNC: 80 MG/DL
HDLC SERPL: 2.2 (ref 0–5)
HGB BLD-MCNC: 11.4 G/DL (ref 11.5–16)
LDLC SERPL CALC-MCNC: 75.4 MG/DL (ref 0–100)
MCH RBC QN AUTO: 28.4 PG (ref 26–34)
MCHC RBC AUTO-ENTMCNC: 31.3 G/DL (ref 30–36.5)
MCV RBC AUTO: 90.8 FL (ref 80–99)
MICROALBUMIN UR-MCNC: 0.64 MG/DL
MICROALBUMIN/CREAT UR-RTO: 4 MG/G (ref 0–30)
NRBC # BLD: 0 K/UL (ref 0–0.01)
NRBC BLD-RTO: 0 PER 100 WBC
PLATELET # BLD AUTO: 272 K/UL (ref 150–400)
PMV BLD AUTO: 11.2 FL (ref 8.9–12.9)
POTASSIUM SERPL-SCNC: 4.4 MMOL/L (ref 3.5–5.1)
PROT SERPL-MCNC: 7.2 G/DL (ref 6.4–8.2)
RBC # BLD AUTO: 4.01 M/UL (ref 3.8–5.2)
SODIUM SERPL-SCNC: 138 MMOL/L (ref 136–145)
TRIGL SERPL-MCNC: 98 MG/DL
VLDLC SERPL CALC-MCNC: 19.6 MG/DL
WBC # BLD AUTO: 7 K/UL (ref 3.6–11)

## 2024-09-27 ENCOUNTER — HOSPITAL ENCOUNTER (OUTPATIENT)
Facility: HOSPITAL | Age: 64
Discharge: HOME | End: 2024-09-30
Attending: STUDENT IN AN ORGANIZED HEALTH CARE EDUCATION/TRAINING PROGRAM
Payer: MEDICARE
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
## 2024-09-27 DIAGNOSIS — M25.561 ACUTE PAIN OF RIGHT KNEE: ICD-10-CM

## 2024-09-27 PROCEDURE — 73562 X-RAY EXAM OF KNEE 3: CPT

## 2024-10-07 DIAGNOSIS — I10 ESSENTIAL (PRIMARY) HYPERTENSION: ICD-10-CM

## 2024-10-08 RX ORDER — FLUTICASONE PROPIONATE 50 MCG
SPRAY, SUSPENSION (ML) NASAL
Qty: 1 EACH | Refills: 2 | Status: SHIPPED | OUTPATIENT
Start: 2024-10-08

## 2024-10-08 RX ORDER — LISINOPRIL 5 MG/1
5 TABLET ORAL DAILY
Qty: 90 TABLET | Refills: 1 | Status: SHIPPED | OUTPATIENT
Start: 2024-10-08

## 2024-10-18 DIAGNOSIS — K21.9 GASTROESOPHAGEAL REFLUX DISEASE, UNSPECIFIED WHETHER ESOPHAGITIS PRESENT: ICD-10-CM

## 2024-10-18 RX ORDER — MECOBALAMIN 5000 MCG
TABLET,DISINTEGRATING ORAL DAILY
Qty: 90 CAPSULE | Refills: 1 | OUTPATIENT
Start: 2024-10-18

## 2024-10-29 ENCOUNTER — ANESTHESIA (OUTPATIENT)
Facility: HOSPITAL | Age: 64
End: 2024-10-29
Payer: MEDICARE

## 2024-10-29 ENCOUNTER — HOSPITAL ENCOUNTER (OUTPATIENT)
Facility: HOSPITAL | Age: 64
Setting detail: OUTPATIENT SURGERY
Discharge: HOME | End: 2024-10-29
Attending: INTERNAL MEDICINE | Admitting: INTERNAL MEDICINE
Payer: MEDICARE

## 2024-10-29 ENCOUNTER — ANESTHESIA EVENT (OUTPATIENT)
Facility: HOSPITAL | Age: 64
End: 2024-10-29
Payer: MEDICARE

## 2024-10-29 VITALS
OXYGEN SATURATION: 100 % | SYSTOLIC BLOOD PRESSURE: 150 MMHG | RESPIRATION RATE: 14 BRPM | HEIGHT: 61 IN | WEIGHT: 129.7 LBS | HEART RATE: 71 BPM | DIASTOLIC BLOOD PRESSURE: 74 MMHG | TEMPERATURE: 97.6 F | BODY MASS INDEX: 24.49 KG/M2

## 2024-10-29 PROCEDURE — 3600007512: Performed by: INTERNAL MEDICINE

## 2024-10-29 PROCEDURE — 3700000001 HC ADD 15 MINUTES (ANESTHESIA): Performed by: INTERNAL MEDICINE

## 2024-10-29 PROCEDURE — 7100000011 HC PHASE II RECOVERY - ADDTL 15 MIN: Performed by: INTERNAL MEDICINE

## 2024-10-29 PROCEDURE — 3600007502: Performed by: INTERNAL MEDICINE

## 2024-10-29 PROCEDURE — 2709999900 HC NON-CHARGEABLE SUPPLY: Performed by: INTERNAL MEDICINE

## 2024-10-29 PROCEDURE — 6360000002 HC RX W HCPCS: Performed by: NURSE ANESTHETIST, CERTIFIED REGISTERED

## 2024-10-29 PROCEDURE — 7100000010 HC PHASE II RECOVERY - FIRST 15 MIN: Performed by: INTERNAL MEDICINE

## 2024-10-29 PROCEDURE — 3700000000 HC ANESTHESIA ATTENDED CARE: Performed by: INTERNAL MEDICINE

## 2024-10-29 RX ORDER — SODIUM CHLORIDE 0.9 % (FLUSH) 0.9 %
5-40 SYRINGE (ML) INJECTION EVERY 12 HOURS SCHEDULED
Status: DISCONTINUED | OUTPATIENT
Start: 2024-10-29 | End: 2024-10-29 | Stop reason: HOSPADM

## 2024-10-29 RX ORDER — SODIUM CHLORIDE 9 MG/ML
INJECTION, SOLUTION INTRAVENOUS PRN
Status: DISCONTINUED | OUTPATIENT
Start: 2024-10-29 | End: 2024-10-29 | Stop reason: HOSPADM

## 2024-10-29 RX ORDER — SODIUM CHLORIDE 0.9 % (FLUSH) 0.9 %
5-40 SYRINGE (ML) INJECTION PRN
Status: DISCONTINUED | OUTPATIENT
Start: 2024-10-29 | End: 2024-10-29 | Stop reason: HOSPADM

## 2024-10-29 RX ORDER — SODIUM CHLORIDE 9 MG/ML
INJECTION, SOLUTION INTRAVENOUS CONTINUOUS
Status: DISCONTINUED | OUTPATIENT
Start: 2024-10-29 | End: 2024-10-29 | Stop reason: HOSPADM

## 2024-10-29 RX ORDER — POLYETHYLENE GLYCOL-3350 AND ELECTROLYTES 236; 6.74; 5.86; 2.97; 22.74 G/274.31G; G/274.31G; G/274.31G; G/274.31G; G/274.31G
POWDER, FOR SOLUTION ORAL
COMMUNITY
Start: 2024-09-10

## 2024-10-29 RX ADMIN — PROPOFOL 20 MG: 10 INJECTION, EMULSION INTRAVENOUS at 13:39

## 2024-10-29 RX ADMIN — PROPOFOL 40 MG: 10 INJECTION, EMULSION INTRAVENOUS at 13:42

## 2024-10-29 RX ADMIN — PROPOFOL 30 MG: 10 INJECTION, EMULSION INTRAVENOUS at 13:36

## 2024-10-29 RX ADMIN — PROPOFOL 50 MG: 10 INJECTION, EMULSION INTRAVENOUS at 13:35

## 2024-10-29 RX ADMIN — PROPOFOL 30 MG: 10 INJECTION, EMULSION INTRAVENOUS at 13:45

## 2024-10-29 ASSESSMENT — PAIN - FUNCTIONAL ASSESSMENT: PAIN_FUNCTIONAL_ASSESSMENT: 0-10

## 2024-10-29 ASSESSMENT — PAIN SCALES - GENERAL: PAINLEVEL_OUTOF10: 0

## 2024-10-29 NOTE — PERIOP NOTE
Initial RN admission and assessment performed and documented in Endoscopy navigator.     Patient evaluated by anesthesia in pre-procedure holding.     All procedural vital signs, airway assessment, and level of consciousness information monitored and recorded by anesthesia staff on the anesthesia record.     Report received from CRNA post procedure.  Patient transported to recovery area by RN.    Endoscopy post procedure time out was performed and specimens were verified with physician.    Endoscope was pre-cleaned at bedside immediately following procedure by ELI Simms

## 2024-10-29 NOTE — OP NOTE
[facility] [address] [address] [phone]               Colonoscopy Operative Report      Indications:  Family h/o colon cancer, normal colonoscopy 2018    :  Hansel Montes MD    Staff: Circulator: Isidra Mullen RN  Endoscopy Technician: Tevin Shen     Referring Provider: Jagruti Haines MD    Sedation:  MAC anesthesia    Procedure Details:  After informed consent was obtained with all risks and benefits of procedure explained and preoperative exam completed, the patient was taken to the endoscopy suite and placed in the left lateral decubitus position.  Upon sequential sedation as per above, a digital rectal exam was performed  And was normal.  The Olympus videocolonoscope  was inserted in the rectum and carefully advanced to the cecum, which was identified by the ileocecal valve and appendiceal orifice.  The quality of preparation was good.  The colonoscope was slowly withdrawn with careful evaluation between folds. Retroflexion in the rectum was performed and was normal..     Findings:   Rectum: normal  Sigmoid: normal  Descending Colon: normal  Transverse Colon: normal  Ascending Colon: normal  Cecum: normal  Terminal Ileum: not intubated    Interventions:  none    Specimen Removed: * No specimens in log *    EBL:  None    Complications:  None; patient tolerated the procedure well.    Impression:  -Normal colon without any polyps.     Recommendations:   -Resume normal medication(s).  -Regular diet.  -Repeat colonoscopy in 5 years.      Discharge Disposition:  Home in the company of a  when able to ambulate.    Hansel Montes MD  10/29/2024  1:47 PM

## 2024-10-29 NOTE — ANESTHESIA PRE PROCEDURE
Blocker:  Not on Beta Blocker         Neuro/Psych:   Negative Neuro/Psych ROS  (+) headaches:depression/anxiety             GI/Hepatic/Renal:   (+) GERD:          Endo/Other: Negative Endo/Other ROS   (+) blood dyscrasia: anemia:..                 Abdominal: normal exam            Vascular: negative vascular ROS.         Other Findings:       Anesthesia Plan      MAC     ASA 2       Induction: intravenous.      Anesthetic plan and risks discussed with patient.      Plan discussed with CRNA.    Attending anesthesiologist reviewed and agrees with Preprocedure content            Dario Cutler MD   10/29/2024

## 2024-10-29 NOTE — DISCHARGE INSTRUCTIONS
[facility] [address] [address]          Anabela Weber  [id number]  1960    COLON DISCHARGE INSTRUCTIONS    DISCOMFORT:  Redness at IV site- apply warm compress to area; if redness or soreness persist- contact your physician  There may be a slight amount of blood passed from the rectum  Gaseous discomfort- walking, belching will help relieve any discomfort    DIET:   Regular diet.     ACTIVITY:  You may resume your normal daily activities it is recommended that you spend the remainder of the day resting -  avoid any strenuous activity.  You may not operate a vehicle for 12 hours  You may not engage in an occupation involving machinery or appliances for rest of today  You may not drink alcoholic beverages for at least 12 hours  Avoid making any critical decisions for at least 24 hour    CALL M.D.  ANY SIGN OF:   Increasing pain, nausea, vomiting  Abdominal distension (swelling)  New increased bleeding (oral or rectal)  Fever (chills)  Pain in chest area  Bloody discharge from nose or mouth  Shortness of breath     Follow-up Instructions:   Call Dr. Hansel Montes for any questions or problems.   Telephone # [phone]  Biopsy results will be available in  5 to 7 days    Impression:  -Normal colon without any polyps.     Recommendations:   -Resume normal medication(s).  -Regular diet.  -Repeat colonoscopy in 5 years.      Hansel Montes MD

## 2024-10-29 NOTE — H&P
[facility] [address] [address] [phone]        History and Physical     NAME:  Anabela Weber   :  1960   MRN:  [id number]         HPI:  Anabela Weber is a 64 y.o. female here for colonoscopy. Negative colonoscopy . Brother had colon cancer. No GI symptoms.     Past Surgical History:   Procedure Laterality Date    CATARACT REMOVAL Bilateral 2021    COLONOSCOPY  2014    colonoscopy done at [facility] GI 14    ENDOMETRIAL ABLATION  2012    [facility]    GI  10/2019    COLONOSCOPY    HYSTERECTOMY (CERVIX STATUS UNKNOWN)  2019    PERFORMED AT     HYSTEROSCOPY  2018    With D&C - [facility]    OTHER SURGICAL HISTORY      laproscopy,btl    THYROIDECTOMY  10/25/2019    For benign multi lobular goiter    UPPER GASTROINTESTINAL ENDOSCOPY      [facility] 2016:     Past Medical History:   Diagnosis Date    Acute allergic rhinitis     Allergic rhinitis     Anxiety disorder     Anxiety Disorder/insomnia    Back pain     Chest pain, atypical     Chronic sinusitis     Depression     Elbow pain, right     Elevated serum gamma-glutamyl transferase level     elevated GGTP 79-no exposure to hepb/c immune to hep b    Equivocal stress echocardiogram     Stress echo 2015 neg, EF 55%    Fibroid, uterine     Genital herpes     GERD (gastroesophageal reflux disease)     GERD: s/p EGD 11-5-10: chronic inactive gastritis    High transaminase levels     History of colonoscopy     colonoscopy  one hypereplastic polyp recheck in 8-10 years.    HSV infection     Hydronephrosis 2016    right hydronephrosis transiently 16    Hypertension     Not on any medication    Menopause     Normocytic anemia     Osteoarthritis     KNEES, ELBOWS, HANDS    Paresthesia of left upper extremity     Pelvic pain     Post-menopausal bleeding 10/2018    Thyroid disease     Tinea pedis     Visual field scotoma      Social History     Tobacco Use

## 2024-10-29 NOTE — ANESTHESIA POSTPROCEDURE EVALUATION
Department of Anesthesiology  Postprocedure Note    Patient: Anabela Weber  MRN: [id number]  YOB: 1960  Date of evaluation: 10/29/2024    Procedure Summary       Date: 10/29/24 Room / Location: [place] [address] / [facility] ENDOSCOPY    Anesthesia Start: 1333 Anesthesia Stop: 1347    Procedure: COLONOSCOPY Diagnosis:       FH: colon cancer      (FH: colon cancer [Z80.0])    Surgeons: Hansel Montes MD Responsible Provider: Dario Cutler Jr., MD    Anesthesia Type: MAC ASA Status: 2            Anesthesia Type: MAC    Garth Phase I: Garth Score: 10    Garth Phase II: Garth Score: 10    Anesthesia Post Evaluation    Patient location during evaluation: PACU  Patient participation: complete - patient participated  Level of consciousness: awake  Airway patency: patent  Nausea & Vomiting: no nausea  Cardiovascular status: blood pressure returned to baseline and hemodynamically stable  Respiratory status: acceptable  Hydration status: stable    No notable events documented.

## 2025-01-09 RX ORDER — FLUTICASONE PROPIONATE 50 MCG
SPRAY, SUSPENSION (ML) NASAL
Qty: 1 EACH | Refills: 2 | Status: SHIPPED | OUTPATIENT
Start: 2025-01-09

## 2025-01-09 NOTE — TELEPHONE ENCOUNTER
PCP: Jagruti Haines MD    Last appt: [unfilled]  Future Appointments   Date Time Provider Department Center   3/19/2025 10:00 AM Jagruti Haines MD [facility] DEP       Requested Prescriptions     Pending Prescriptions Disp Refills    fluticasone (FLONASE) 50 MCG/ACT nasal spray [Pharmacy Med Name: FLUTICASONE PROP 50 MCG SPRAY]       Sig: SPRAY 2 SPRAYS BY NASAL ROUTE DAILY AS NEEDED FOR RHINITIS.       Prior labs and Blood pressures:  BP Readings from Last 3 Encounters:   10/29/24 (!) 150/74   09/19/24 122/74   03/19/24 (!) 142/74     Lab Results   Component Value Date/Time     09/19/2024 10:40 AM    K 4.4 09/19/2024 10:40 AM     09/19/2024 10:40 AM    CO2 29 09/19/2024 10:40 AM    BUN 14 09/19/2024 10:40 AM    GFRAA >60 05/05/2022 12:45 PM     No results found for: \"HBA1C\", \"[id number]\"  Lab Results   Component Value Date/Time    CHOL 175 09/19/2024 10:40 AM    HDL 80 09/19/2024 10:40 AM    LDL 75.4 09/19/2024 10:40 AM    LDL 89.2 09/19/2023 11:47 AM    VLDL 19.6 09/19/2024 10:40 AM     No results found for: \"VITD3\"    Lab Results   Component Value Date/Time    TSH 3.790 11/01/2021 12:01 PM

## 2025-03-19 ENCOUNTER — OFFICE VISIT (OUTPATIENT)
Facility: CLINIC | Age: 65
End: 2025-03-19
Payer: MEDICARE

## 2025-03-19 VITALS
DIASTOLIC BLOOD PRESSURE: 66 MMHG | HEIGHT: 61 IN | TEMPERATURE: 97 F | OXYGEN SATURATION: 98 % | HEART RATE: 85 BPM | SYSTOLIC BLOOD PRESSURE: 105 MMHG | RESPIRATION RATE: 20 BRPM | BODY MASS INDEX: 25.37 KG/M2 | WEIGHT: 134.4 LBS

## 2025-03-19 DIAGNOSIS — D64.9 NORMOCYTIC ANEMIA: ICD-10-CM

## 2025-03-19 DIAGNOSIS — Z13.1 ENCOUNTER FOR SCREENING FOR DIABETES MELLITUS: ICD-10-CM

## 2025-03-19 DIAGNOSIS — I10 ESSENTIAL (PRIMARY) HYPERTENSION: ICD-10-CM

## 2025-03-19 DIAGNOSIS — R35.1 NOCTURIA: ICD-10-CM

## 2025-03-19 DIAGNOSIS — Z00.00 MEDICARE ANNUAL WELLNESS VISIT, SUBSEQUENT: Primary | ICD-10-CM

## 2025-03-19 LAB
ANION GAP SERPL CALC-SCNC: 4 MMOL/L (ref 2–12)
APPEARANCE UR: CLEAR
BACTERIA URNS QL MICRO: NEGATIVE /HPF
BILIRUB UR QL: NEGATIVE
BUN SERPL-MCNC: 13 MG/DL (ref 6–20)
BUN/CREAT SERPL: 15 (ref 12–20)
CALCIUM SERPL-MCNC: 9.5 MG/DL (ref 8.5–10.1)
CHLORIDE SERPL-SCNC: 107 MMOL/L (ref 97–108)
CO2 SERPL-SCNC: 26 MMOL/L (ref 21–32)
COLOR UR: NORMAL
CREAT SERPL-MCNC: 0.85 MG/DL (ref 0.55–1.02)
EPITH CASTS URNS QL MICRO: NORMAL /LPF
ERYTHROCYTE [DISTWIDTH] IN BLOOD BY AUTOMATED COUNT: 13.8 % (ref 11.5–14.5)
EST. AVERAGE GLUCOSE BLD GHB EST-MCNC: 114 MG/DL
FOLATE SERPL-MCNC: 9.5 NG/ML (ref 5–21)
GLUCOSE SERPL-MCNC: 99 MG/DL (ref 65–100)
GLUCOSE UR STRIP.AUTO-MCNC: NEGATIVE MG/DL
HBA1C MFR BLD: 5.6 % (ref 4–5.6)
HCT VFR BLD AUTO: 37 % (ref 35–47)
HGB BLD-MCNC: 12.1 G/DL (ref 11.5–16)
HGB UR QL STRIP: NEGATIVE
HYALINE CASTS URNS QL MICRO: NORMAL /LPF (ref 0–5)
IRON SATN MFR SERPL: 37 % (ref 20–50)
IRON SERPL-MCNC: 99 UG/DL (ref 35–150)
KETONES UR QL STRIP.AUTO: NEGATIVE MG/DL
LEUKOCYTE ESTERASE UR QL STRIP.AUTO: NEGATIVE
MCH RBC QN AUTO: 29.5 PG (ref 26–34)
MCHC RBC AUTO-ENTMCNC: 32.7 G/DL (ref 30–36.5)
MCV RBC AUTO: 90.2 FL (ref 80–99)
NITRITE UR QL STRIP.AUTO: NEGATIVE
NRBC # BLD: 0 K/UL (ref 0–0.01)
NRBC BLD-RTO: 0 PER 100 WBC
PH UR STRIP: 8 (ref 5–8)
PLATELET # BLD AUTO: 268 K/UL (ref 150–400)
PMV BLD AUTO: 11.4 FL (ref 8.9–12.9)
POTASSIUM SERPL-SCNC: 4.4 MMOL/L (ref 3.5–5.1)
PROT UR STRIP-MCNC: NEGATIVE MG/DL
RBC # BLD AUTO: 4.1 M/UL (ref 3.8–5.2)
RBC #/AREA URNS HPF: NORMAL /HPF (ref 0–5)
SODIUM SERPL-SCNC: 137 MMOL/L (ref 136–145)
SP GR UR REFRACTOMETRY: 1.02 (ref 1–1.03)
TIBC SERPL-MCNC: 270 UG/DL (ref 250–450)
URINE CULTURE IF INDICATED: NORMAL
UROBILINOGEN UR QL STRIP.AUTO: 0.2 EU/DL (ref 0.2–1)
VIT B12 SERPL-MCNC: 1033 PG/ML (ref 193–986)
WBC # BLD AUTO: 5.7 K/UL (ref 3.6–11)
WBC URNS QL MICRO: NORMAL /HPF (ref 0–4)

## 2025-03-19 PROCEDURE — 1123F ACP DISCUSS/DSCN MKR DOCD: CPT | Performed by: STUDENT IN AN ORGANIZED HEALTH CARE EDUCATION/TRAINING PROGRAM

## 2025-03-19 PROCEDURE — 99214 OFFICE O/P EST MOD 30 MIN: CPT | Performed by: STUDENT IN AN ORGANIZED HEALTH CARE EDUCATION/TRAINING PROGRAM

## 2025-03-19 PROCEDURE — 3074F SYST BP LT 130 MM HG: CPT | Performed by: STUDENT IN AN ORGANIZED HEALTH CARE EDUCATION/TRAINING PROGRAM

## 2025-03-19 PROCEDURE — G0439 PPPS, SUBSEQ VISIT: HCPCS | Performed by: STUDENT IN AN ORGANIZED HEALTH CARE EDUCATION/TRAINING PROGRAM

## 2025-03-19 PROCEDURE — 3078F DIAST BP <80 MM HG: CPT | Performed by: STUDENT IN AN ORGANIZED HEALTH CARE EDUCATION/TRAINING PROGRAM

## 2025-03-19 SDOH — ECONOMIC STABILITY: FOOD INSECURITY: WITHIN THE PAST 12 MONTHS, YOU WORRIED THAT YOUR FOOD WOULD RUN OUT BEFORE YOU GOT MONEY TO BUY MORE.: NEVER TRUE

## 2025-03-19 SDOH — ECONOMIC STABILITY: FOOD INSECURITY: WITHIN THE PAST 12 MONTHS, THE FOOD YOU BOUGHT JUST DIDN'T LAST AND YOU DIDN'T HAVE MONEY TO GET MORE.: NEVER TRUE

## 2025-03-19 ASSESSMENT — ENCOUNTER SYMPTOMS
RHINORRHEA: 0
COUGH: 0
VOMITING: 0
ABDOMINAL PAIN: 0
SHORTNESS OF BREATH: 0
NAUSEA: 0

## 2025-03-19 ASSESSMENT — PATIENT HEALTH QUESTIONNAIRE - PHQ9
8. MOVING OR SPEAKING SO SLOWLY THAT OTHER PEOPLE COULD HAVE NOTICED. OR THE OPPOSITE, BEING SO FIGETY OR RESTLESS THAT YOU HAVE BEEN MOVING AROUND A LOT MORE THAN USUAL: NOT AT ALL
5. POOR APPETITE OR OVEREATING: NOT AT ALL
10. IF YOU CHECKED OFF ANY PROBLEMS, HOW DIFFICULT HAVE THESE PROBLEMS MADE IT FOR YOU TO DO YOUR WORK, TAKE CARE OF THINGS AT HOME, OR GET ALONG WITH OTHER PEOPLE: NOT DIFFICULT AT ALL
3. TROUBLE FALLING OR STAYING ASLEEP: NOT AT ALL
SUM OF ALL RESPONSES TO PHQ QUESTIONS 1-9: 0
SUM OF ALL RESPONSES TO PHQ QUESTIONS 1-9: 0
2. FEELING DOWN, DEPRESSED OR HOPELESS: NOT AT ALL
SUM OF ALL RESPONSES TO PHQ QUESTIONS 1-9: 0
SUM OF ALL RESPONSES TO PHQ QUESTIONS 1-9: 0
1. LITTLE INTEREST OR PLEASURE IN DOING THINGS: NOT AT ALL
7. TROUBLE CONCENTRATING ON THINGS, SUCH AS READING THE NEWSPAPER OR WATCHING TELEVISION: NOT AT ALL
6. FEELING BAD ABOUT YOURSELF - OR THAT YOU ARE A FAILURE OR HAVE LET YOURSELF OR YOUR FAMILY DOWN: NOT AT ALL
9. THOUGHTS THAT YOU WOULD BE BETTER OFF DEAD, OR OF HURTING YOURSELF: NOT AT ALL
4. FEELING TIRED OR HAVING LITTLE ENERGY: NOT AT ALL

## 2025-03-19 NOTE — PROGRESS NOTES
dentified pt with two pt identifiers(name and ).    Chief Complaint   Patient presents with    Medicare AWV     Patient here for Medicare Wellness visit.        Health Maintenance Due   Topic    HIV screen     Shingles vaccine (1 of 2)    DTaP/Tdap/Td vaccine (3 - Td or Tdap)    Annual Wellness Visit (Medicare Advantage)        Wt Readings from Last 3 Encounters:   25 61 kg (134 lb 6.4 oz)   10/29/24 58.8 kg (129 lb 11.2 oz)   24 59 kg (130 lb)     Temp Readings from Last 3 Encounters:   25 97 °F (36.1 °C) (Temporal)   10/29/24 97.6 °F (36.4 °C) (Temporal)   24 97.3 °F (36.3 °C) (Temporal)     BP Readings from Last 3 Encounters:   25 105/66   10/29/24 (!) 150/74   24 122/74     Pulse Readings from Last 3 Encounters:   25 85   10/29/24 71   24 77           Coordination of Care Questionnaire:  :   1. \"Have you been to the ER, urgent care clinic since your last visit?  Hospitalized since your last visit?\" no    2. \"Have you seen or consulted any other health care providers outside of the [facility] since your last visit?\" no     3. For patients aged 45-75: Has the patient had a colonoscopy / FIT/ Cologuard? Yes 10/2024      If the patient is female:    4. For patients aged 40-74: Has the patient had a mammogram within the past 2 years? Yes       5. For patients aged 21-65: Has the patient had a pap smear? Yes Due this year     3) Do you have an Advance Directive on file? yes  Are you interested in receiving information about Advance Directives? no    Patient is accompanied by self I have received verbal consent from Anabela Weber to discuss any/all medical information while they are present in the room.

## 2025-03-19 NOTE — PROGRESS NOTES
Assessment/Plan:     Diagnoses and all orders for this visit:    Medicare annual wellness visit, subsequent  -Annual Medicare wellness questionnaire reviewed  -Immunization recommendations discussed  -Screening recommendations reviewed    Essential (primary) hypertension  -     CBC; Future  -     Basic Metabolic Panel; Future  -Chronic.  Stable.  -Check routine lab work today  -Continue current dosage of lisinopril 5 mg daily    Normocytic anemia  -     CBC; Future  -     Iron and TIBC; Future  -     Vitamin B12 & Folate; Future  -Previous history of mild normocytic anemia  -Check lab work today    Nocturia  -     Urinalysis with Reflex to Culture; Future  -Patient endorses several week history of nocturia  -Denies any other associated symptoms  -Recommend limiting liquids especially anything with caffeine later in the evening  -Check a UA to rule out infection    Encounter for screening for diabetes mellitus  -     Hemoglobin A1C; Future       Please note that this dictation was completed with [employer], the [employer] voice recognition software. Quite often unanticipated grammatical, syntax, homophones, and other interpretive errors are inadvertently transcribed by the computer software. Please disregard these errors. Please excuse any errors that have escaped final proofreading.      Return in about 6 months (around 9/19/2025).     Discussed expected course/resolution/complications of diagnosis in detail with patient.    Medication risks/benefits/costs/interactions/alternatives discussed with patient.    Pt expressed understanding with the diagnosis and plan      Subjective:      Anabela Weber is a 64 y.o. female who presents for had concerns including Medicare AWV (Patient here for Medicare Wellness visit.).     Current Outpatient Medications   Medication Sig Dispense Refill    fluticasone (FLONASE) 50 MCG/ACT nasal spray SPRAY 2 SPRAYS BY NASAL ROUTE DAILY AS NEEDED FOR RHINITIS. 1 each 2    lisinopril

## 2025-03-19 NOTE — PATIENT INSTRUCTIONS
Learning About Being Active as an Older Adult  Why is being active important as you get older?     Being active is one of the best things you can do for your health. And it's never too late to start. Being active--or getting active, if you aren't already--has definite benefits. It can:  Give you more energy,  Keep your mind sharp.  Improve balance to reduce your risk of falls.  Help you manage chronic illness with fewer medicines.  No matter how old you are, how fit you are, or what health problems you have, there is a form of activity that will work for you. And the more physical activity you can do, the better your overall health will be.  What kinds of activity can help you stay healthy?  Being more active will make your daily activities easier. Physical activity includes planned exercise and things you do in daily life. There are four types of activity:  Aerobic.  Doing aerobic activity makes your heart and lungs strong.  Includes walking, dancing, and gardening.  Aim for at least 2½ hours spread throughout the week.  It improves your energy and can help you sleep better.  Muscle-strengthening.  This type of activity can help maintain muscle and strengthen bones.  Includes climbing stairs, using resistance bands, and lifting or carrying heavy loads.  Aim for at least twice a week.  It can help protect the knees and other joints.  Stretching.  Stretching gives you better range of motion in joints and muscles.  Includes upper arm stretches, calf stretches, and gentle yoga.  Aim for at least twice a week, preferably after your muscles are warmed up from other activities.  It can help you function better in daily life.  Balancing.  This helps you stay coordinated and have good posture.  Includes heel-to-toe walking, maye chi, and certain types of yoga.  Aim for at least 3 days a week.  It can reduce your risk of falling.  Even if you have a hard time meeting the recommendations, it's better to be more active

## 2025-03-21 ENCOUNTER — RESULTS FOLLOW-UP (OUTPATIENT)
Facility: CLINIC | Age: 65
End: 2025-03-21

## 2025-04-04 DIAGNOSIS — I10 ESSENTIAL (PRIMARY) HYPERTENSION: ICD-10-CM

## 2025-04-04 RX ORDER — LISINOPRIL 5 MG/1
5 TABLET ORAL DAILY
Qty: 90 TABLET | Refills: 1 | Status: SHIPPED | OUTPATIENT
Start: 2025-04-04

## 2025-04-10 DIAGNOSIS — J30.9 ALLERGIC RHINITIS, UNSPECIFIED SEASONALITY, UNSPECIFIED TRIGGER: Primary | ICD-10-CM

## 2025-04-10 RX ORDER — FLUTICASONE PROPIONATE 50 MCG
SPRAY, SUSPENSION (ML) NASAL
Qty: 1 EACH | Refills: 5 | Status: SHIPPED | OUTPATIENT
Start: 2025-04-10

## 2025-04-15 ENCOUNTER — TELEPHONE (OUTPATIENT)
Facility: CLINIC | Age: 65
End: 2025-04-15

## 2025-04-15 NOTE — TELEPHONE ENCOUNTER
I spoke to Ms. Weber.  We had received a mammogram result dated 09/24/24.  She stated her GYN, Dr. Yosi Liu, ordered that and she has her next imaging this Thursday, 04/17/25

## 2025-04-16 ENCOUNTER — HOSPITAL ENCOUNTER (EMERGENCY)
Facility: HOSPITAL | Age: 65
Discharge: HOME | End: 2025-04-16
Attending: EMERGENCY MEDICINE
Payer: MEDICARE

## 2025-04-16 ENCOUNTER — APPOINTMENT (OUTPATIENT)
Facility: HOSPITAL | Age: 65
End: 2025-04-16
Payer: MEDICARE

## 2025-04-16 VITALS
HEIGHT: 61 IN | TEMPERATURE: 98.4 F | HEART RATE: 67 BPM | RESPIRATION RATE: 12 BRPM | DIASTOLIC BLOOD PRESSURE: 76 MMHG | BODY MASS INDEX: 25.64 KG/M2 | SYSTOLIC BLOOD PRESSURE: 122 MMHG | WEIGHT: 135.8 LBS | OXYGEN SATURATION: 100 %

## 2025-04-16 DIAGNOSIS — R20.0 NUMBNESS AND TINGLING: Primary | ICD-10-CM

## 2025-04-16 DIAGNOSIS — R20.2 NUMBNESS AND TINGLING: Primary | ICD-10-CM

## 2025-04-16 LAB
ALBUMIN SERPL-MCNC: 3.7 G/DL (ref 3.5–5)
ALBUMIN/GLOB SERPL: 1 (ref 1.1–2.2)
ALP SERPL-CCNC: 93 U/L (ref 45–117)
ALT SERPL-CCNC: 59 U/L (ref 12–78)
ANION GAP SERPL CALC-SCNC: 9 MMOL/L (ref 2–12)
AST SERPL-CCNC: 34 U/L (ref 15–37)
BASOPHILS # BLD: 0.02 K/UL (ref 0–0.1)
BASOPHILS NFR BLD: 0.3 % (ref 0–1)
BILIRUB SERPL-MCNC: 0.4 MG/DL (ref 0.2–1)
BUN SERPL-MCNC: 9 MG/DL (ref 6–20)
BUN/CREAT SERPL: 10 (ref 12–20)
CALCIUM SERPL-MCNC: 9.1 MG/DL (ref 8.5–10.1)
CHLORIDE SERPL-SCNC: 105 MMOL/L (ref 97–108)
CO2 SERPL-SCNC: 26 MMOL/L (ref 21–32)
CREAT SERPL-MCNC: 0.9 MG/DL (ref 0.55–1.02)
DIFFERENTIAL METHOD BLD: ABNORMAL
EOSINOPHIL # BLD: 0.16 K/UL (ref 0–0.4)
EOSINOPHIL NFR BLD: 2.5 % (ref 0–7)
ERYTHROCYTE [DISTWIDTH] IN BLOOD BY AUTOMATED COUNT: 13.8 % (ref 11.5–14.5)
GLOBULIN SER CALC-MCNC: 3.7 G/DL (ref 2–4)
GLUCOSE SERPL-MCNC: 102 MG/DL (ref 65–100)
HCT VFR BLD AUTO: 35.8 % (ref 35–47)
HGB BLD-MCNC: 11.9 G/DL (ref 11.5–16)
IMM GRANULOCYTES # BLD AUTO: 0.04 K/UL (ref 0–0.04)
IMM GRANULOCYTES NFR BLD AUTO: 0.6 % (ref 0–0.5)
LYMPHOCYTES # BLD: 1.77 K/UL (ref 0.8–3.5)
LYMPHOCYTES NFR BLD: 27.1 % (ref 12–49)
MCH RBC QN AUTO: 29.1 PG (ref 26–34)
MCHC RBC AUTO-ENTMCNC: 33.2 G/DL (ref 30–36.5)
MCV RBC AUTO: 87.5 FL (ref 80–99)
MONOCYTES # BLD: 0.39 K/UL (ref 0–1)
MONOCYTES NFR BLD: 6 % (ref 5–13)
NEUTS SEG # BLD: 4.14 K/UL (ref 1.8–8)
NEUTS SEG NFR BLD: 63.5 % (ref 32–75)
NRBC # BLD: 0 K/UL (ref 0–0.01)
NRBC BLD-RTO: 0 PER 100 WBC
PLATELET # BLD AUTO: 247 K/UL (ref 150–400)
PMV BLD AUTO: 10.6 FL (ref 8.9–12.9)
POTASSIUM SERPL-SCNC: 4 MMOL/L (ref 3.5–5.1)
PROT SERPL-MCNC: 7.4 G/DL (ref 6.4–8.2)
RBC # BLD AUTO: 4.09 M/UL (ref 3.8–5.2)
SODIUM SERPL-SCNC: 140 MMOL/L (ref 136–145)
WBC # BLD AUTO: 6.5 K/UL (ref 3.6–11)

## 2025-04-16 PROCEDURE — 99284 EMERGENCY DEPT VISIT MOD MDM: CPT

## 2025-04-16 PROCEDURE — 36415 COLL VENOUS BLD VENIPUNCTURE: CPT

## 2025-04-16 PROCEDURE — 80053 COMPREHEN METABOLIC PANEL: CPT

## 2025-04-16 PROCEDURE — 70551 MRI BRAIN STEM W/O DYE: CPT

## 2025-04-16 PROCEDURE — 85025 COMPLETE CBC W/AUTO DIFF WBC: CPT

## 2025-04-16 ASSESSMENT — PAIN DESCRIPTION - ORIENTATION: ORIENTATION: LEFT

## 2025-04-16 ASSESSMENT — PAIN DESCRIPTION - LOCATION: LOCATION: LEG;FOOT;ARM

## 2025-04-16 ASSESSMENT — PAIN SCALES - GENERAL: PAINLEVEL_OUTOF10: 10

## 2025-04-16 ASSESSMENT — PAIN DESCRIPTION - DESCRIPTORS: DESCRIPTORS: TINGLING

## 2025-04-16 NOTE — ED PROVIDER NOTES
[facility]  EMERGENCY DEPARTMENT ENCOUNTER      Pt Name: Anabela Weber  MRN: [id number]  Birthdate 1960  Date of evaluation: 4/16/2025  Provider: Richard Barron MD    CHIEF COMPLAINT     No chief complaint on file.        HISTORY OF PRESENT ILLNESS   (Location/Symptom, Timing/Onset, Context/Setting, Quality, Duration, Modifying Factors, Severity)  Note limiting factors.   64-year-old with a history of hypertension, anemia, fibroids, arthritis, depression, GERD, gastritis, back pain, allergies, anxiety.  She presents with complaints of left upper extremity and left lower extremity numbness/tingling.  The symptoms began 3 days ago.  She denies any weakness.  No change of vision or speech.  She states that she has had some upper extremity paresthesias in the past years ago.  She admits being under a lot of stress from having to take care of her mother.  She denies any pain to me.          Review of External Medical Records:     Nursing Notes were reviewed.    REVIEW OF SYSTEMS    (2-9 systems for level 4, 10 or more for level 5)     Review of Systems    Except as noted above the remainder of the review of systems was reviewed and negative.       PAST MEDICAL HISTORY     Past Medical History:   Diagnosis Date    Acute allergic rhinitis     Allergic rhinitis     Anxiety disorder     Anxiety Disorder/insomnia    Back pain     Chest pain, atypical     Chronic sinusitis     Depression     Elbow pain, right     Elevated serum gamma-glutamyl transferase level     elevated GGTP 79-no exposure to hepb/c immune to hep b    Equivocal stress echocardiogram     Stress echo feb 2015 neg, EF 55%    Fibroid, uterine     Genital herpes     GERD (gastroesophageal reflux disease)     GERD: s/p EGD 11-5-10: chronic inactive gastritis    High transaminase levels     History of colonoscopy     colonoscopy 9/09 one hypereplastic polyp recheck in 8-10 years.    HSV infection     Hydronephrosis 02/02/2016

## 2025-04-16 NOTE — ED TRIAGE NOTES
Patient ambulatory with steady gait to treatment room, with tingling in her left arm/hands and left leg/feet since Sunday of this week. Patient states this is constant. Denies headache. Denies difficulty with speech or vision changes. Pain is 10/10.     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     No loss of feeling or weakness to either side.   No deficits.

## 2025-04-16 NOTE — ED NOTES
EKG and labs obtained. PIV started. Pt educated on MRI and provided with MRI checklist. Pt denies other needs at this time. Call bell within reach.

## 2025-04-20 LAB
EKG ATRIAL RATE: 59 BPM
EKG DIAGNOSIS: NORMAL
EKG P AXIS: 65 DEGREES
EKG P-R INTERVAL: 172 MS
EKG Q-T INTERVAL: 428 MS
EKG QRS DURATION: 66 MS
EKG QTC CALCULATION (BAZETT): 423 MS
EKG R AXIS: 12 DEGREES
EKG T AXIS: 61 DEGREES
EKG VENTRICULAR RATE: 59 BPM

## 2025-04-22 ENCOUNTER — OFFICE VISIT (OUTPATIENT)
Facility: CLINIC | Age: 65
End: 2025-04-22
Payer: MEDICARE

## 2025-04-22 VITALS
RESPIRATION RATE: 16 BRPM | HEIGHT: 61 IN | HEART RATE: 75 BPM | WEIGHT: 133 LBS | DIASTOLIC BLOOD PRESSURE: 75 MMHG | BODY MASS INDEX: 25.11 KG/M2 | TEMPERATURE: 98.9 F | OXYGEN SATURATION: 100 % | SYSTOLIC BLOOD PRESSURE: 127 MMHG

## 2025-04-22 DIAGNOSIS — R20.2 PARESTHESIA OF LEFT UPPER AND LOWER EXTREMITY: Primary | ICD-10-CM

## 2025-04-22 PROCEDURE — 1123F ACP DISCUSS/DSCN MKR DOCD: CPT | Performed by: STUDENT IN AN ORGANIZED HEALTH CARE EDUCATION/TRAINING PROGRAM

## 2025-04-22 PROCEDURE — 3078F DIAST BP <80 MM HG: CPT | Performed by: STUDENT IN AN ORGANIZED HEALTH CARE EDUCATION/TRAINING PROGRAM

## 2025-04-22 PROCEDURE — 99214 OFFICE O/P EST MOD 30 MIN: CPT | Performed by: STUDENT IN AN ORGANIZED HEALTH CARE EDUCATION/TRAINING PROGRAM

## 2025-04-22 PROCEDURE — 3074F SYST BP LT 130 MM HG: CPT | Performed by: STUDENT IN AN ORGANIZED HEALTH CARE EDUCATION/TRAINING PROGRAM

## 2025-04-22 ASSESSMENT — ENCOUNTER SYMPTOMS
VOMITING: 0
RHINORRHEA: 0
COUGH: 0
SHORTNESS OF BREATH: 0
ABDOMINAL PAIN: 0
NAUSEA: 0

## 2025-04-22 NOTE — PROGRESS NOTES
Room  15    Identified pt with two pt identifiers(name and ). Reviewed record in preparation for visit and have obtained necessary documentation. All patient medications has been reviewed.  Chief Complaint   Patient presents with    Follow-up         Health Maintenance Due   Topic    HIV screen     Shingles vaccine (1 of 2)    DTaP/Tdap/Td vaccine (3 - Td or Tdap)    COVID-19 Vaccine ( season)     Health Maintenance Review: Patient reminded of \"due or due soon\" health maintenance. I have asked the patient to contact his/her primary care provider (PCP) for follow-up on his/her health maintenance.        Wt Readings from Last 3 Encounters:   25 60.3 kg (133 lb)   25 61.6 kg (135 lb 12.9 oz)   25 61 kg (134 lb 6.4 oz)     Temp Readings from Last 3 Encounters:   25 98.9 °F (37.2 °C) (Oral)   25 98.4 °F (36.9 °C) (Oral)   25 97 °F (36.1 °C) (Temporal)     BP Readings from Last 3 Encounters:   25 127/75   25 122/76   25 105/66     Pulse Readings from Last 3 Encounters:   25 75   25 67   25 85       Vitals:    25 1316   BP: 127/75   Pulse: 75   Resp: 16   Temp: 98.9 °F (37.2 °C)   SpO2: 100%        1. \"Have you been to the ER, urgent care clinic since your last visit?  Hospitalized since your last visit?\" Yes [facility]     2. \"Have you seen or consulted any other health care providers outside of the [facility] since your last visit?\" No     3. For patients aged 45-75: Has the patient had a colonoscopy / FIT/ Cologuard? Yes      If the patient is female:    4. For patients aged 40-74: Has the patient had a mammogram within the past 2 years? Yes      5. For patients aged 21-65: Has the patient had a pap smear? No

## 2025-04-22 NOTE — PROGRESS NOTES
Assessment/Plan:     Diagnoses and all orders for this visit:    Paresthesia of left upper and lower extremity  -     XR CERVICAL SPINE (4 OR 5 VIEWS); Future  -     [facility] - Kota Maciel MD, Orthopedic Surgery (back, neck, spine), [place] (Lizzie Ferraro)  -     [facility] - Nathan Conner MD, Neurology, [place]  -Patient endorses recent history of left sided numbness and tingling intermittently  -States is in left upper and lower extremity  -Was evaluated in the emergency room had labs and a MRI of the brain that was unremarkable  -Suspect could be a pinched nerve  -Therefore will obtain an x-ray of the cervical spine  -Also will refer to spine specialist for further evaluation as well as neurology  -Return precautions discussed     Please note that this dictation was completed with [email or web address], the computer voice recognition software. Quite often unanticipated grammatical, syntax, homophones, and other interpretive errors are inadvertently transcribed by the computer software. Please disregard these errors. Please excuse any errors that have escaped final proofreading.      No follow-ups on file.     Discussed expected course/resolution/complications of diagnosis in detail with patient.    Medication risks/benefits/costs/interactions/alternatives discussed with patient.    Pt expressed understanding with the diagnosis and plan      Subjective:      Anabela Weber is a 64 y.o. female who presents for had concerns including Follow-up.     Current Outpatient Medications   Medication Sig Dispense Refill    fluticasone (FLONASE) 50 MCG/ACT nasal spray SPRAY 2 SPRAYS BY NASAL ROUTE DAILY AS NEEDED FOR RHINITIS. 1 each 5    lisinopril (PRINIVIL;ZESTRIL) 5 MG tablet TAKE 1 TABLET BY MOUTH EVERY DAY 90 tablet 1    magnesium gluconate (MAGONATE) 500 MG tablet Take 1 tablet by mouth 2 times daily      calcium carbonate (TUMS) 500 MG chewable tablet Take 1 tablet by mouth daily      Cholecalciferol (VITAMIN D3) 75 MCG (3000

## 2025-05-02 ENCOUNTER — HOSPITAL ENCOUNTER (OUTPATIENT)
Facility: HOSPITAL | Age: 65
Discharge: HOME | End: 2025-05-02
Payer: MEDICARE

## 2025-05-02 DIAGNOSIS — R20.2 PARESTHESIA OF LEFT UPPER AND LOWER EXTREMITY: ICD-10-CM

## 2025-05-02 PROCEDURE — 72050 X-RAY EXAM NECK SPINE 4/5VWS: CPT

## 2025-05-09 ENCOUNTER — RESULTS FOLLOW-UP (OUTPATIENT)
Facility: CLINIC | Age: 65
End: 2025-05-09

## 2025-05-18 DIAGNOSIS — K21.9 GASTROESOPHAGEAL REFLUX DISEASE, UNSPECIFIED WHETHER ESOPHAGITIS PRESENT: ICD-10-CM

## 2025-05-20 RX ORDER — MECOBALAMIN 5000 MCG
TABLET,DISINTEGRATING ORAL DAILY
Qty: 90 CAPSULE | Refills: 1 | OUTPATIENT
Start: 2025-05-20

## 2025-08-13 DIAGNOSIS — J30.9 ALLERGIC RHINITIS, UNSPECIFIED SEASONALITY, UNSPECIFIED TRIGGER: ICD-10-CM

## 2025-08-13 DIAGNOSIS — I10 ESSENTIAL (PRIMARY) HYPERTENSION: ICD-10-CM

## 2025-08-13 RX ORDER — FLUTICASONE PROPIONATE 50 MCG
SPRAY, SUSPENSION (ML) NASAL
Qty: 48 ML | Refills: 0 | Status: SHIPPED | OUTPATIENT
Start: 2025-08-13

## 2025-08-13 RX ORDER — LISINOPRIL 5 MG/1
5 TABLET ORAL DAILY
Qty: 90 TABLET | Refills: 0 | Status: SHIPPED | OUTPATIENT
Start: 2025-08-13

## 2025-08-19 ENCOUNTER — OFFICE VISIT (OUTPATIENT)
Facility: CLINIC | Age: 65
End: 2025-08-19
Payer: COMMERCIAL

## 2025-08-19 VITALS
HEIGHT: 61 IN | DIASTOLIC BLOOD PRESSURE: 68 MMHG | TEMPERATURE: 98 F | SYSTOLIC BLOOD PRESSURE: 120 MMHG | BODY MASS INDEX: 24.92 KG/M2 | WEIGHT: 132 LBS | HEART RATE: 87 BPM | OXYGEN SATURATION: 98 % | RESPIRATION RATE: 17 BRPM

## 2025-08-19 DIAGNOSIS — J30.9 ALLERGIC RHINITIS, UNSPECIFIED SEASONALITY, UNSPECIFIED TRIGGER: Primary | ICD-10-CM

## 2025-08-19 PROCEDURE — 3074F SYST BP LT 130 MM HG: CPT | Performed by: STUDENT IN AN ORGANIZED HEALTH CARE EDUCATION/TRAINING PROGRAM

## 2025-08-19 PROCEDURE — 1123F ACP DISCUSS/DSCN MKR DOCD: CPT | Performed by: STUDENT IN AN ORGANIZED HEALTH CARE EDUCATION/TRAINING PROGRAM

## 2025-08-19 PROCEDURE — 3078F DIAST BP <80 MM HG: CPT | Performed by: STUDENT IN AN ORGANIZED HEALTH CARE EDUCATION/TRAINING PROGRAM

## 2025-08-19 PROCEDURE — 99213 OFFICE O/P EST LOW 20 MIN: CPT | Performed by: STUDENT IN AN ORGANIZED HEALTH CARE EDUCATION/TRAINING PROGRAM

## 2025-08-19 RX ORDER — AZELASTINE 1 MG/ML
1 SPRAY, METERED NASAL 2 TIMES DAILY PRN
COMMUNITY
End: 2025-08-21 | Stop reason: SDUPTHER

## 2025-08-19 ASSESSMENT — ENCOUNTER SYMPTOMS
NAUSEA: 0
SHORTNESS OF BREATH: 0
VOMITING: 0
ABDOMINAL PAIN: 0

## 2025-08-20 ASSESSMENT — ENCOUNTER SYMPTOMS
COUGH: 1
RHINORRHEA: 1

## 2025-08-21 DIAGNOSIS — J30.9 ALLERGIC RHINITIS, UNSPECIFIED SEASONALITY, UNSPECIFIED TRIGGER: Primary | ICD-10-CM

## 2025-08-21 RX ORDER — AZELASTINE 1 MG/ML
1 SPRAY, METERED NASAL 2 TIMES DAILY PRN
Qty: 30 ML | Refills: 1 | Status: SHIPPED | OUTPATIENT
Start: 2025-08-21

## (undated) DEVICE — PACK,EENT,TURBAN DRAPE,PK II: Brand: MEDLINE

## (undated) DEVICE — AGENT HEMSTAT W2XL3IN OXIDIZED REGENERATED CELOS ABSRB

## (undated) DEVICE — GARMENT,MEDLINE,DVT,INT,CALF,MED, GEN2: Brand: MEDLINE

## (undated) DEVICE — SUTURE MCRYL SZ 4-0 L27IN ABSRB UD L19MM PS-2 1/2 CIR PRIM Y426H

## (undated) DEVICE — SUPPLEMENT DIGESTIVE H2O SOL GI-EASE

## (undated) DEVICE — KIT,1200CC CANISTER,3/16"X6' TUBING: Brand: MEDLINE INDUSTRIES, INC.

## (undated) DEVICE — BIPOLAR FORCEPS CORD: Brand: VALLEYLAB

## (undated) DEVICE — TUBING IRRIG COMPATIBLE W ERBE MEDIVATOR PMP HYDR

## (undated) DEVICE — (D)PREP SKN CHLRAPRP APPL 26ML -- CONVERT TO ITEM 371833

## (undated) DEVICE — ROCKER SWITCH PENCIL BLADE ELECTRODE, HOLSTER: Brand: EDGE

## (undated) DEVICE — SPONGE GZ W4XL4IN COT RADPQ HIGHLY ABSRB

## (undated) DEVICE — MAGNETIC INSTR DRAPE 20X16: Brand: MEDLINE INDUSTRIES, INC.

## (undated) DEVICE — SUT SLK 2-0SH 30IN BLK --

## (undated) DEVICE — SYR 10ML LUER LOK 1/5ML GRAD --

## (undated) DEVICE — SUTURE VCRL SZ 3-0 L27IN ABSRB UD L26MM SH 1/2 CIR J416H

## (undated) DEVICE — STRIP,CLOSURE,WOUND,MEDI-STRIP,1/2X4: Brand: MEDLINE

## (undated) DEVICE — HOOK RETRCT L5MM E SHRP SELF RET SYS LONE STAR

## (undated) DEVICE — SURGICAL PROCEDURE PACK BASIN MAJ SET CUST NO CAUT

## (undated) DEVICE — ENDOTRACH TUBE 8229507 CONT EMG 7MM ROHS: Brand: NIM CONTACT®

## (undated) DEVICE — TOWEL SURG W17XL27IN STD BLU COT NONFENESTRATED PREWASHED

## (undated) DEVICE — HANDLE LT SNAP ON ULT DURABLE LENS FOR TRUMPF ALC DISPOSABLE

## (undated) DEVICE — INFECTION CONTROL KIT SYS

## (undated) DEVICE — Z DISCONTINUED GLOVE SURG SZ 7 L12IN FNGR THK13MIL WHT ISOLEX POLYISOPRENE

## (undated) DEVICE — GOWN,SIRUS,FABRNF,XL,20/CS: Brand: MEDLINE

## (undated) DEVICE — SOLUTION IV 1000ML 0.9% SOD CHL

## (undated) DEVICE — KIT APPL SPRY HEMSTAT PWDR -- F/HEMADERM FLEXTIP

## (undated) DEVICE — SHEAR RMFG HARMONIC FOCUS 9CM -- OEM ITEM L#322125

## (undated) DEVICE — PROBE 8225101 5PK STD PRASS FL TIP ROHS

## (undated) DEVICE — MASTISOL ADHESIVE LIQ 2/3ML

## (undated) DEVICE — SPONGE: SPECIALTY PEANUT XR 100/CS: Brand: MEDICAL ACTION INDUSTRIES

## (undated) DEVICE — REM POLYHESIVE ADULT PATIENT RETURN ELECTRODE: Brand: VALLEYLAB

## (undated) DEVICE — INSULATED BLADE ELECTRODE: Brand: EDGE